# Patient Record
Sex: MALE | Race: WHITE | NOT HISPANIC OR LATINO | Employment: FULL TIME | URBAN - METROPOLITAN AREA
[De-identification: names, ages, dates, MRNs, and addresses within clinical notes are randomized per-mention and may not be internally consistent; named-entity substitution may affect disease eponyms.]

---

## 2017-06-13 ENCOUNTER — OFFICE VISIT (OUTPATIENT)
Dept: FAMILY MEDICINE | Facility: CLINIC | Age: 37
End: 2017-06-13
Payer: MEDICAID

## 2017-06-13 VITALS
SYSTOLIC BLOOD PRESSURE: 126 MMHG | TEMPERATURE: 99 F | DIASTOLIC BLOOD PRESSURE: 82 MMHG | RESPIRATION RATE: 12 BRPM | HEART RATE: 68 BPM | HEIGHT: 71 IN | BODY MASS INDEX: 23.86 KG/M2 | WEIGHT: 170.44 LBS

## 2017-06-13 DIAGNOSIS — S05.12XA CONTUSION OF GLOBE OF LEFT EYE, INITIAL ENCOUNTER: Primary | ICD-10-CM

## 2017-06-13 DIAGNOSIS — H10.32 ACUTE BACTERIAL CONJUNCTIVITIS OF LEFT EYE: ICD-10-CM

## 2017-06-13 PROCEDURE — 99214 OFFICE O/P EST MOD 30 MIN: CPT | Mod: S$GLB,,, | Performed by: NURSE PRACTITIONER

## 2017-06-13 RX ORDER — NEOMYCIN SULFATE, POLYMYXIN B SULFATE AND DEXAMETHASONE 3.5; 10000; 1 MG/ML; [USP'U]/ML; MG/ML
1 SUSPENSION/ DROPS OPHTHALMIC EVERY 6 HOURS
Qty: 5 ML | Refills: 0 | Status: SHIPPED | OUTPATIENT
Start: 2017-06-13 | End: 2020-03-04 | Stop reason: CLARIF

## 2017-06-13 NOTE — PROGRESS NOTES
"Subjective:       Patient ID: Syed Coleman is a 36 y.o. male.    Chief Complaint: 3 days left eye redness and pain, possible bug flew in it    HPI onset 3 days. Left eye redness, tender. Thinks a bug flew into it. Hurts in the sunlight. Has not applied any drops. Was boating on Saturday and a bug hit his eye. He was wearing contacts. Eye is watery. He is able to open his eye without pain. States his eye feels bruised. He has some blurred vision, but states that is because he cannot wear his contact. He has no other concerns. See ROS.    The following portion of the patients history was reviewed and updated as appropriate: allergies, current medications, past medical and surgical history. Past social history and problem list reviewed. Family PMH and Past social history reviewed. Tobacco, Illicit drug use reviewed.     Review of Systems   Constitutional: Negative for chills, fatigue and fever.   HENT: Negative for congestion, postnasal drip, rhinorrhea, sinus pressure and sore throat.    Eyes: Positive for pain (pressure and soreness), discharge and redness.        See HPI   Respiratory: Negative for cough, chest tightness, shortness of breath and wheezing.    Cardiovascular: Negative for chest pain and palpitations.   Gastrointestinal: Negative for abdominal pain, diarrhea, nausea and vomiting.   Musculoskeletal: Negative for myalgias.   Skin: Negative for wound.   Neurological: Negative for headaches.       Objective:     /82   Pulse 68   Temp 99 °F (37.2 °C) (Oral)   Resp 12   Ht 5' 11" (1.803 m)   Wt 77.3 kg (170 lb 6.7 oz)   BMI 23.77 kg/m²      Physical Exam     Constitutional: oriented to person, place, and time. well-developed and well-nourished.   HENT: normal  Head: Normocephalic.   Eyes: Conjunctivae with erythema. Pupils are equal, round, and reactive to light. he is able to open his eye without pain. Crusting to base of eyelashes. Watering to the eye.   Neck: Normal range of motion. " Neck supple. No tracheal deviation present. No thyromegaly present. no enlarged or tender anterior cervical lymph nodes.  Cardiovascular: Normal rate, regular rhythm and normal heart sounds.    Pulmonary/Chest: Effort normal and breath sounds normal. No respiratory distress. No wheezes.   Musculoskeletal: Normal range of motion. Gait and coordination normal  Assessment:       1. Contusion of globe of left eye, initial encounter    2. Acute bacterial conjunctivitis of left eye        Plan:         Syed was seen today for 3 days left eye redness and pain, possible bug flew in it.    Diagnoses and all orders for this visit:    Contusion of globe of left eye, initial encounter: can apply ice or warm compresses to the eye for comfort.     Acute bacterial conjunctivitis of left eye: will give eye drops for infection of the eye. Do not wear contacts until this has resolved. If not improving over the next 48 hours or if symptoms worsen then let me know, will need to see Optometry.     Other orders  -     neomycin-polymyxin-dexamethasone (MAXITROL) 3.5mg/mL-10,000 unit/mL-0.1 % DrpS; Place 1 drop into the left eye every 6 (six) hours.    Continue current medication  Take medications only as prescribed  Healthy diet, exercise  Adequate rest  Adequate hydration  Avoid allergens  Avoid excessive caffeine

## 2018-10-02 ENCOUNTER — TELEPHONE (OUTPATIENT)
Dept: FAMILY MEDICINE | Facility: CLINIC | Age: 38
End: 2018-10-02

## 2018-10-02 NOTE — TELEPHONE ENCOUNTER
Pt reports he is just having a hard time with his anxiety. Please advise regarding scheduling. LOV 6/13/2017. Allergies reviewed please advise.-RIVERA

## 2018-10-03 ENCOUNTER — TELEPHONE (OUTPATIENT)
Dept: FAMILY MEDICINE | Facility: CLINIC | Age: 38
End: 2018-10-03

## 2018-10-03 NOTE — TELEPHONE ENCOUNTER
----- Message from Shae Dorsey sent at 10/3/2018  9:56 AM CDT -----  Contact: Syed  Type: Needs Medical Advice    Who Called:  Patient   Best Call Back Number: [phone]  Additional Information:  Calling as thought was scheduled for 9:20 and just received text to state scheduled for 3:40; asking for earlier appointment back as working and has to close. Thanks!

## 2018-10-04 ENCOUNTER — OFFICE VISIT (OUTPATIENT)
Dept: FAMILY MEDICINE | Facility: CLINIC | Age: 38
End: 2018-10-04

## 2018-10-04 VITALS
SYSTOLIC BLOOD PRESSURE: 110 MMHG | TEMPERATURE: 98 F | HEIGHT: 71 IN | DIASTOLIC BLOOD PRESSURE: 70 MMHG | BODY MASS INDEX: 22.73 KG/M2 | HEART RATE: 68 BPM | WEIGHT: 162.38 LBS | RESPIRATION RATE: 20 BRPM

## 2018-10-04 DIAGNOSIS — F41.0 PANIC DISORDER: ICD-10-CM

## 2018-10-04 DIAGNOSIS — F41.9 ANXIETY: Primary | ICD-10-CM

## 2018-10-04 DIAGNOSIS — F51.02 TRANSIENT INSOMNIA: ICD-10-CM

## 2018-10-04 PROCEDURE — 99214 OFFICE O/P EST MOD 30 MIN: CPT | Mod: S$GLB,,, | Performed by: NURSE PRACTITIONER

## 2018-10-04 RX ORDER — ALPRAZOLAM 0.25 MG/1
0.25 TABLET ORAL 2 TIMES DAILY PRN
Qty: 40 TABLET | Refills: 0 | Status: ON HOLD | OUTPATIENT
Start: 2018-10-04 | End: 2020-03-06

## 2018-10-04 NOTE — PROGRESS NOTES
"Subjective:       Patient ID: Syed Coleman is a 37 y.o. male.    Chief Complaint: Insomnia and Anxiety    HPI situational disturbance the past 3 days. Feeling more anxious and not sleeping. Feeling depressed, sad. Denies  Any thoughts of wanting  To harm himself or others.  States he has been treated with anxiety in the past.  States this is not a daily issue.  He was given a prescription for Xanax 0.25 in 2015 and that prescription has lasted him until now.  He has the prescription bottle with him today.  The a web site does not show any concerning behavior.  States he does not take the Xanax often just when he feels panicked and overly anxious.  He does not feel that daily medication is needed.  States he has been on antidepressants in the past and does made him feel worse.  He denies any other concerns today.  See review of systems.    The following portion of the patients history was reviewed and updated as appropriate: allergies, current medications, past medical and surgical history. Past social history and problem list reviewed. Family PMH and Past social history reviewed. Tobacco, Illicit drug use reviewed.     Review of Systems   Constitutional: Negative for fatigue and fever.   HENT: Negative.    Eyes: Negative for visual disturbance.   Respiratory: Negative for cough, shortness of breath and wheezing.    Cardiovascular: Negative for chest pain, palpitations and leg swelling.   Gastrointestinal: Negative for abdominal pain, diarrhea, nausea and vomiting.   Musculoskeletal: Negative for arthralgias and gait problem.   Neurological: Negative for headaches.   Psychiatric/Behavioral: Positive for sleep disturbance. Negative for agitation, behavioral problems, dysphoric mood, self-injury and suicidal ideas. The patient is nervous/anxious.        Objective:     /70   Pulse 68   Temp 98.2 °F (36.8 °C) (Oral)   Resp 20   Ht 5' 11" (1.803 m)   Wt 73.7 kg (162 lb 6.4 oz)   BMI 22.65 kg/m²  "     Physical Exam     Constitutional: oriented to person, place, and time. well-developed and well-nourished.   Neck: Normal range of motion. Neck supple. No tracheal deviation present. No thyromegaly present.   Cardiovascular: Normal rate, regular rhythm and normal heart sounds.    Pulmonary/Chest: Effort normal and breath sounds normal. No respiratory distress. No wheezes.   Abdominal: Soft. Bowel sounds are normal. No distension. There is no tenderness.   Musculoskeletal: Normal range of motion.  Gait and coordination normal.   Neurological: oriented to person, place, and time.   Skin: Skin is warm and dry. No rashes or lesions  Psychiatric: Normal mood and affect.Behavior is normal. Judgment and thought content normal.  he does not present as a threat to himself or others.  He does not desire referral to Psychiatry at this time.  Assessment:       1. Anxiety    2. Transient insomnia    3. Panic disorder        Plan:         Syed was seen today for insomnia and anxiety.    Diagnoses and all orders for this visit:    Anxiety: will give him refill of the xanax to take only on PRN basis.  If he feels this  Is something he is going to need daily then will need  To revaluate medication.  -     ALPRAZolam (XANAX) 0.25 MG tablet; Take 1 tablet (0.25 mg total) by mouth 2 (two) times daily as needed for Anxiety. Can take up to 2 at night for insomnia   Do not take with ETOH, Sedatives or other narcotics. Do not take and drive due to potential for sedation. Do not take more than the prescribed amount.    Transient insomnia  -     ALPRAZolam (XANAX) 0.25 MG tablet; Take 1 tablet (0.25 mg total) by mouth 2 (two) times daily as needed for Anxiety. Can take up to 2 at night for insomnia    Panic disorder    Continue current medication  Take medications only as prescribed  Healthy diet, exercise  Adequate rest  Adequate hydration  Avoid allergens  Avoid excessive caffeine

## 2020-03-04 PROBLEM — R07.9 CHEST PAIN: Status: ACTIVE | Noted: 2020-03-04

## 2020-03-04 PROBLEM — R79.89 TROPONIN LEVEL ELEVATED: Status: ACTIVE | Noted: 2020-03-04

## 2020-03-04 PROBLEM — F41.9 ANXIETY: Status: ACTIVE | Noted: 2020-03-04

## 2020-03-04 PROBLEM — R03.0 ELEVATED BLOOD PRESSURE READING: Status: ACTIVE | Noted: 2020-03-04

## 2020-06-04 ENCOUNTER — OFFICE VISIT (OUTPATIENT)
Dept: FAMILY MEDICINE | Facility: CLINIC | Age: 40
End: 2020-06-04
Payer: COMMERCIAL

## 2020-06-04 VITALS
BODY MASS INDEX: 22.96 KG/M2 | DIASTOLIC BLOOD PRESSURE: 76 MMHG | HEART RATE: 74 BPM | WEIGHT: 164 LBS | OXYGEN SATURATION: 98 % | TEMPERATURE: 98 F | HEIGHT: 71 IN | SYSTOLIC BLOOD PRESSURE: 120 MMHG

## 2020-06-04 DIAGNOSIS — M54.41 ACUTE RIGHT-SIDED LOW BACK PAIN WITH RIGHT-SIDED SCIATICA: ICD-10-CM

## 2020-06-04 DIAGNOSIS — I10 ESSENTIAL HYPERTENSION: Primary | ICD-10-CM

## 2020-06-04 DIAGNOSIS — F41.9 ANXIETY: ICD-10-CM

## 2020-06-04 DIAGNOSIS — F17.210 CIGARETTE NICOTINE DEPENDENCE WITHOUT COMPLICATION: ICD-10-CM

## 2020-06-04 PROBLEM — R07.9 CHEST PAIN: Status: RESOLVED | Noted: 2020-03-04 | Resolved: 2020-06-04

## 2020-06-04 PROCEDURE — 99214 PR OFFICE/OUTPT VISIT, EST, LEVL IV, 30-39 MIN: ICD-10-PCS | Mod: S$GLB,,, | Performed by: NURSE PRACTITIONER

## 2020-06-04 PROCEDURE — 99214 OFFICE O/P EST MOD 30 MIN: CPT | Mod: S$GLB,,, | Performed by: NURSE PRACTITIONER

## 2020-06-04 RX ORDER — ATENOLOL AND CHLORTHALIDONE TABLET 50; 25 MG/1; MG/1
1 TABLET ORAL DAILY
Qty: 30 TABLET | Refills: 3 | Status: SHIPPED | OUTPATIENT
Start: 2020-06-04 | End: 2020-10-15 | Stop reason: SDUPTHER

## 2020-06-04 RX ORDER — KETOROLAC TROMETHAMINE 10 MG/1
10 TABLET, FILM COATED ORAL 3 TIMES DAILY
Qty: 15 TABLET | Refills: 0 | Status: SHIPPED | OUTPATIENT
Start: 2020-06-04 | End: 2021-04-27 | Stop reason: ALTCHOICE

## 2020-06-04 RX ORDER — TIZANIDINE 4 MG/1
4 TABLET ORAL EVERY 6 HOURS PRN
Qty: 20 TABLET | Refills: 0 | Status: SHIPPED | OUTPATIENT
Start: 2020-06-04 | End: 2020-06-14

## 2020-06-04 RX ORDER — ALPRAZOLAM 0.25 MG/1
0.5 TABLET ORAL 2 TIMES DAILY PRN
Qty: 40 TABLET | Refills: 0 | Status: SHIPPED | OUTPATIENT
Start: 2020-06-04 | End: 2021-04-27 | Stop reason: SDUPTHER

## 2020-06-04 NOTE — PROGRESS NOTES
Subjective:       Patient ID: Syed Coleman is a 39 y.o. male.    Chief Complaint: Back Pain and Medication Refill    HPI onset one week with lower back pain. 9/10 on pain scale. Sciatica right side. Wears a back brace at work. Has two bulging disks. Pain worse with movement. No weakness to lower extremities. States has a flare up at times, has been doing more heavy lifting lately since being short handed at work. He has been to chiropractor in the past but did not find it helped much.    He has HTN. BP in good range. He is doing well on current medication. He has anxiety at times. He takes small dose of xanax only on PRN basis.  States has been high stress time since the Covid outbreak. He shows no signs of abusing medication per the [employer] website.     He had cardiac cath in March. No blockages found.     He does not want to have labs done today. He refuses to have Tdap. States he will have these done at his next visit.     The following portion of the patients history was reviewed and updated as appropriate: allergies, current medications, past medical and surgical history. Past social history and problem list reviewed. Family PMH and Past social history reviewed. Tobacco, Illicit drug use reviewed.      Review of patient's allergies indicates:   Allergen Reactions    Ibuprofen      Other reaction(s): gasric upset         Current Outpatient Medications:     ALPRAZolam (XANAX) 0.25 MG tablet, Take 2 tablets (0.5 mg total) by mouth 2 (two) times daily as needed for Anxiety. Can take up to 2 at night for insomnia, Disp: 40 tablet, Rfl: 0    atenoloL-chlorthalidone (TENORETIC) 50-25 mg Tab, Take 1 tablet by mouth once daily., Disp: 30 tablet, Rfl: 3    Past Medical History:   Diagnosis Date    Hypertension     Tobacco use disorder        Past Surgical History:   Procedure Laterality Date    CORONARY ANGIOGRAPHY N/A 3/5/2020    Procedure: ANGIOGRAM, CORONARY ARTERY;  Surgeon: Ryan Sanchez MD;  Location:  [facility] CATH;  Service: Cardiovascular;  Laterality: N/A;    FINGER SURGERY Right     5th digit    LEFT HEART CATHETERIZATION Left 3/5/2020    Procedure: Left heart cath;  Surgeon: Ryan Sanchez MD;  Location: [facility] CATH;  Service: Cardiovascular;  Laterality: Left;    OTHER SURGICAL HISTORY      injection procedure for pain       Social History     Socioeconomic History    Marital status:      Spouse name: Not on file    Number of children: Not on file    Years of education: Not on file    Highest education level: Not on file   Occupational History    Not on file   Social Needs    Financial resource strain: Not on file    Food insecurity:     Worry: Not on file     Inability: Not on file    Transportation needs:     Medical: Not on file     Non-medical: Not on file   Tobacco Use    Smoking status: Current Every Day Smoker     Packs/day: 1.00     Types: Cigarettes    Smokeless tobacco: Never Used   Substance and Sexual Activity    Alcohol use: Yes     Alcohol/week: 0.0 standard drinks     Comment: Occasionally: Beer    Drug use: No    Sexual activity: Not on file   Lifestyle    Physical activity:     Days per week: Not on file     Minutes per session: Not on file    Stress: Not on file   Relationships    Social connections:     Talks on phone: Not on file     Gets together: Not on file     Attends Zoroastrianism service: Not on file     Active member of club or organization: Not on file     Attends meetings of clubs or organizations: Not on file     Relationship status: Not on file   Other Topics Concern    Not on file   Social History Narrative    Not on file     Review of Systems   Constitutional: Negative for chills, fatigue and fever.   HENT: Negative for congestion, sneezing, trouble swallowing and voice change.    Eyes: Negative for redness and visual disturbance.   Respiratory: Negative for cough, choking, chest tightness, shortness of breath and wheezing.    Cardiovascular: Negative for  "chest pain, palpitations and leg swelling.   Gastrointestinal: Negative for abdominal distention, abdominal pain, blood in stool, constipation, diarrhea, nausea and vomiting.   Genitourinary: Negative for difficulty urinating, flank pain and frequency.   Musculoskeletal: Positive for back pain (lower back on the right side. radiates down back of right leg. worse with movement. ). Negative for arthralgias and gait problem.   Skin: Negative for rash and wound.   Neurological: Negative for dizziness, weakness and headaches.   Hematological: Negative for adenopathy. Does not bruise/bleed easily.   Psychiatric/Behavioral: Negative for dysphoric mood and sleep disturbance. The patient is nervous/anxious.        Objective:      /76   Pulse 74   Temp 98 °F (36.7 °C) (Oral)   Ht 5' 11" (1.803 m)   Wt 74.4 kg (164 lb 0.4 oz)   SpO2 98%   BMI 22.88 kg/m²      Physical Exam   Constitutional: He is oriented to person, place, and time. He appears well-developed and well-nourished. No distress.   HENT:   Head: Normocephalic and atraumatic.   Mouth/Throat: No oropharyngeal exudate.   Eyes: Pupils are equal, round, and reactive to light. Conjunctivae are normal. Right eye exhibits no discharge. Left eye exhibits no discharge.   Neck: Normal range of motion. Neck supple. No JVD present. No thyromegaly present.   Cardiovascular: Normal rate, regular rhythm and normal heart sounds. Exam reveals no gallop.   No murmur heard.  Pulmonary/Chest: Effort normal and breath sounds normal. No respiratory distress. He has no wheezes. He has no rales. He exhibits no tenderness.   Abdominal: Soft. Bowel sounds are normal. He exhibits no distension. There is no tenderness. There is no rebound and no guarding.   Musculoskeletal: Normal range of motion. He exhibits no edema.   Tenderness along the right lumbar spine area with palpation. Muscle spasms. Positive leg raises on the right side.normal upper and lower extremity strength "   Lymphadenopathy:     He has no cervical adenopathy.   Neurological: He is alert and oriented to person, place, and time.   Skin: Skin is warm and dry. Capillary refill takes less than 2 seconds. No rash noted. He is not diaphoretic.   Psychiatric: He has a normal mood and affect. His behavior is normal. Judgment and thought content normal.   Nursing note and vitals reviewed.      Assessment:       1. Essential hypertension    2. Anxiety    3. Acute right-sided low back pain with right-sided sciatica    4. Cigarette nicotine dependence without complication        Plan:       Essential hypertension: good BP control. Continue current medication.    Anxiety: no indication of abuse by the [employer] website.  -     ALPRAZolam (XANAX) 0.25 MG tablet; Take 2 tablets (0.5 mg total) by mouth 2 (two) times daily as needed for Anxiety. Can take up to 2 at night for insomnia  Dispense: 40 tablet; Refill: 0    He understands Xanax is a benzodiazepine with the potential for addiction and tolerance.  The patient also understands that all benzos impair reflexes and cognition and so the patient should not drive, operate heavy machinery or make significant decisions while taking the benzodiazepine.  The patient should also not take the benzodiazepines with alcohol.    Acute right-sided low back pain with right-sided sciatica: toradol 10mg TID for 5 days, zanaflex for muscle spasms. Do not take zanaflex and drive if sedation occurs. Do not mix with ETOH or other sedative type medications.    Cigarette nicotine dependence without complication: smoking cessation encouraged.     Other orders  -     ketorolac (TORADOL) 10 mg tablet; Take 1 tablet (10 mg total) by mouth 3 (three) times daily.  Dispense: 15 tablet; Refill: 0  -     tiZANidine (ZANAFLEX) 4 MG tablet; Take 1 tablet (4 mg total) by mouth every 6 (six) hours as needed.  Dispense: 20 tablet; Refill: 0  -     atenoloL-chlorthalidone (TENORETIC) 50-25 mg Tab; Take 1 tablet by mouth  once daily.  Dispense: 30 tablet; Refill: 3       Continue current medication  Take medications only as prescribed  Healthy diet, exercise  Adequate rest  Adequate hydration  Avoid allergens  Avoid excessive caffeine     follow up 6 months

## 2020-10-15 DIAGNOSIS — I10 ESSENTIAL HYPERTENSION: Primary | ICD-10-CM

## 2020-10-15 RX ORDER — ATENOLOL AND CHLORTHALIDONE TABLET 50; 25 MG/1; MG/1
1 TABLET ORAL DAILY
Qty: 30 TABLET | Refills: 3 | Status: SHIPPED | OUTPATIENT
Start: 2020-10-15 | End: 2021-02-02

## 2021-04-04 DIAGNOSIS — I10 ESSENTIAL HYPERTENSION: ICD-10-CM

## 2021-04-05 RX ORDER — ATENOLOL AND CHLORTHALIDONE TABLET 50; 25 MG/1; MG/1
TABLET ORAL
Qty: 30 TABLET | Refills: 0 | Status: SHIPPED | OUTPATIENT
Start: 2021-04-05 | End: 2021-04-27 | Stop reason: SDUPTHER

## 2021-04-27 ENCOUNTER — OFFICE VISIT (OUTPATIENT)
Dept: FAMILY MEDICINE | Facility: CLINIC | Age: 41
End: 2021-04-27
Payer: COMMERCIAL

## 2021-04-27 VITALS
HEART RATE: 67 BPM | DIASTOLIC BLOOD PRESSURE: 72 MMHG | WEIGHT: 160.5 LBS | RESPIRATION RATE: 16 BRPM | BODY MASS INDEX: 22.47 KG/M2 | SYSTOLIC BLOOD PRESSURE: 116 MMHG | TEMPERATURE: 99 F | OXYGEN SATURATION: 98 % | HEIGHT: 71 IN

## 2021-04-27 DIAGNOSIS — F32.A DEPRESSION, UNSPECIFIED DEPRESSION TYPE: ICD-10-CM

## 2021-04-27 DIAGNOSIS — F41.9 ANXIETY: ICD-10-CM

## 2021-04-27 DIAGNOSIS — F17.200 TOBACCO USE DISORDER: ICD-10-CM

## 2021-04-27 DIAGNOSIS — I10 ESSENTIAL HYPERTENSION: ICD-10-CM

## 2021-04-27 DIAGNOSIS — F51.01 PRIMARY INSOMNIA: ICD-10-CM

## 2021-04-27 DIAGNOSIS — Z00.00 ANNUAL PHYSICAL EXAM: Primary | ICD-10-CM

## 2021-04-27 DIAGNOSIS — E78.5 HYPERLIPIDEMIA LDL GOAL <160: ICD-10-CM

## 2021-04-27 PROCEDURE — 99396 PREV VISIT EST AGE 40-64: CPT | Mod: S$GLB,,, | Performed by: NURSE PRACTITIONER

## 2021-04-27 PROCEDURE — 99396 PR PREVENTIVE VISIT,EST,40-64: ICD-10-PCS | Mod: S$GLB,,, | Performed by: NURSE PRACTITIONER

## 2021-04-27 RX ORDER — TRAZODONE HYDROCHLORIDE 50 MG/1
TABLET ORAL
Qty: 60 TABLET | Refills: 4 | Status: SHIPPED | OUTPATIENT
Start: 2021-04-27 | End: 2021-11-01

## 2021-04-27 RX ORDER — ALPRAZOLAM 0.25 MG/1
0.5 TABLET ORAL 2 TIMES DAILY PRN
Qty: 40 TABLET | Refills: 0 | Status: SHIPPED | OUTPATIENT
Start: 2021-04-27 | End: 2022-06-01

## 2021-04-27 RX ORDER — ATENOLOL AND CHLORTHALIDONE TABLET 50; 25 MG/1; MG/1
1 TABLET ORAL DAILY
Qty: 90 TABLET | Refills: 2 | Status: SHIPPED | OUTPATIENT
Start: 2021-04-27 | End: 2022-01-25

## 2021-05-01 ENCOUNTER — LAB VISIT (OUTPATIENT)
Dept: LAB | Facility: HOSPITAL | Age: 41
End: 2021-05-01
Payer: COMMERCIAL

## 2021-05-01 DIAGNOSIS — E78.5 HYPERLIPIDEMIA LDL GOAL <160: ICD-10-CM

## 2021-05-01 DIAGNOSIS — I10 ESSENTIAL HYPERTENSION: ICD-10-CM

## 2021-05-01 LAB
ALBUMIN SERPL BCP-MCNC: 3.9 G/DL (ref 3.5–5.2)
ALP SERPL-CCNC: 71 U/L (ref 55–135)
ALT SERPL W/O P-5'-P-CCNC: 22 U/L (ref 10–44)
ANION GAP SERPL CALC-SCNC: 11 MMOL/L (ref 8–16)
AST SERPL-CCNC: 32 U/L (ref 10–40)
BASOPHILS # BLD AUTO: 0.07 K/UL (ref 0–0.2)
BASOPHILS NFR BLD: 0.7 % (ref 0–1.9)
BILIRUB SERPL-MCNC: 0.7 MG/DL (ref 0.1–1)
BUN SERPL-MCNC: 6 MG/DL (ref 6–20)
CALCIUM SERPL-MCNC: 9.9 MG/DL (ref 8.7–10.5)
CHLORIDE SERPL-SCNC: 101 MMOL/L (ref 95–110)
CHOLEST SERPL-MCNC: 189 MG/DL (ref 120–199)
CHOLEST/HDLC SERPL: 2.4 {RATIO} (ref 2–5)
CO2 SERPL-SCNC: 28 MMOL/L (ref 23–29)
CREAT SERPL-MCNC: 0.7 MG/DL (ref 0.5–1.4)
DIFFERENTIAL METHOD: ABNORMAL
EOSINOPHIL # BLD AUTO: 0.2 K/UL (ref 0–0.5)
EOSINOPHIL NFR BLD: 1.5 % (ref 0–8)
ERYTHROCYTE [DISTWIDTH] IN BLOOD BY AUTOMATED COUNT: 12.7 % (ref 11.5–14.5)
EST. GFR  (AFRICAN AMERICAN): >60 ML/MIN/1.73 M^2
EST. GFR  (NON AFRICAN AMERICAN): >60 ML/MIN/1.73 M^2
GLUCOSE SERPL-MCNC: 104 MG/DL (ref 70–110)
HCT VFR BLD AUTO: 49 % (ref 40–54)
HDLC SERPL-MCNC: 78 MG/DL (ref 40–75)
HDLC SERPL: 41.3 % (ref 20–50)
HGB BLD-MCNC: 16.3 G/DL (ref 14–18)
IMM GRANULOCYTES # BLD AUTO: 0.04 K/UL (ref 0–0.04)
IMM GRANULOCYTES NFR BLD AUTO: 0.4 % (ref 0–0.5)
LDLC SERPL CALC-MCNC: 95.4 MG/DL (ref 63–159)
LYMPHOCYTES # BLD AUTO: 1.2 K/UL (ref 1–4.8)
LYMPHOCYTES NFR BLD: 11.5 % (ref 18–48)
MCH RBC QN AUTO: 32.5 PG (ref 27–31)
MCHC RBC AUTO-ENTMCNC: 33.3 G/DL (ref 32–36)
MCV RBC AUTO: 98 FL (ref 82–98)
MONOCYTES # BLD AUTO: 0.7 K/UL (ref 0.3–1)
MONOCYTES NFR BLD: 6.7 % (ref 4–15)
NEUTROPHILS # BLD AUTO: 8.4 K/UL (ref 1.8–7.7)
NEUTROPHILS NFR BLD: 79.2 % (ref 38–73)
NONHDLC SERPL-MCNC: 111 MG/DL
NRBC BLD-RTO: 0 /100 WBC
PLATELET # BLD AUTO: 283 K/UL (ref 150–450)
PMV BLD AUTO: 10.2 FL (ref 9.2–12.9)
POTASSIUM SERPL-SCNC: 3.9 MMOL/L (ref 3.5–5.1)
PROT SERPL-MCNC: 7.4 G/DL (ref 6–8.4)
RBC # BLD AUTO: 5.01 M/UL (ref 4.6–6.2)
SODIUM SERPL-SCNC: 140 MMOL/L (ref 136–145)
TRIGL SERPL-MCNC: 78 MG/DL (ref 30–150)
WBC # BLD AUTO: 10.64 K/UL (ref 3.9–12.7)

## 2021-05-01 PROCEDURE — 36415 COLL VENOUS BLD VENIPUNCTURE: CPT | Mod: PO | Performed by: NURSE PRACTITIONER

## 2021-05-01 PROCEDURE — 80053 COMPREHEN METABOLIC PANEL: CPT | Performed by: NURSE PRACTITIONER

## 2021-05-01 PROCEDURE — 80061 LIPID PANEL: CPT | Performed by: NURSE PRACTITIONER

## 2021-05-01 PROCEDURE — 85025 COMPLETE CBC W/AUTO DIFF WBC: CPT | Performed by: NURSE PRACTITIONER

## 2022-05-26 ENCOUNTER — OFFICE VISIT (OUTPATIENT)
Dept: FAMILY MEDICINE | Facility: CLINIC | Age: 42
End: 2022-05-26
Payer: COMMERCIAL

## 2022-05-26 VITALS
SYSTOLIC BLOOD PRESSURE: 126 MMHG | WEIGHT: 154.13 LBS | DIASTOLIC BLOOD PRESSURE: 84 MMHG | OXYGEN SATURATION: 99 % | HEART RATE: 64 BPM | BODY MASS INDEX: 21.49 KG/M2 | TEMPERATURE: 98 F | RESPIRATION RATE: 12 BRPM

## 2022-05-26 DIAGNOSIS — E78.5 HYPERLIPIDEMIA LDL GOAL <160: ICD-10-CM

## 2022-05-26 DIAGNOSIS — F41.9 ANXIETY: ICD-10-CM

## 2022-05-26 DIAGNOSIS — Z00.00 LABORATORY EXAM ORDERED AS PART OF ROUTINE GENERAL MEDICAL EXAMINATION: ICD-10-CM

## 2022-05-26 DIAGNOSIS — S81.811A LACERATION OF RIGHT LOWER EXTREMITY, INITIAL ENCOUNTER: ICD-10-CM

## 2022-05-26 DIAGNOSIS — F17.210 CIGARETTE NICOTINE DEPENDENCE WITHOUT COMPLICATION: ICD-10-CM

## 2022-05-26 DIAGNOSIS — I10 ESSENTIAL HYPERTENSION: ICD-10-CM

## 2022-05-26 DIAGNOSIS — Z48.02 ENCOUNTER FOR REMOVAL OF SUTURES: ICD-10-CM

## 2022-05-26 DIAGNOSIS — Z11.59 NEED FOR HEPATITIS C SCREENING TEST: ICD-10-CM

## 2022-05-26 DIAGNOSIS — F51.01 PRIMARY INSOMNIA: ICD-10-CM

## 2022-05-26 DIAGNOSIS — Z00.00 ANNUAL PHYSICAL EXAM: Primary | ICD-10-CM

## 2022-05-26 PROCEDURE — 99396 PREV VISIT EST AGE 40-64: CPT | Mod: 25,S$GLB,, | Performed by: NURSE PRACTITIONER

## 2022-05-26 PROCEDURE — 3008F PR BODY MASS INDEX (BMI) DOCUMENTED: ICD-10-PCS | Mod: CPTII,S$GLB,, | Performed by: NURSE PRACTITIONER

## 2022-05-26 PROCEDURE — 1159F PR MEDICATION LIST DOCUMENTED IN MEDICAL RECORD: ICD-10-PCS | Mod: CPTII,S$GLB,, | Performed by: NURSE PRACTITIONER

## 2022-05-26 PROCEDURE — 1160F RVW MEDS BY RX/DR IN RCRD: CPT | Mod: CPTII,S$GLB,, | Performed by: NURSE PRACTITIONER

## 2022-05-26 PROCEDURE — 3074F SYST BP LT 130 MM HG: CPT | Mod: CPTII,S$GLB,, | Performed by: NURSE PRACTITIONER

## 2022-05-26 PROCEDURE — 3074F PR MOST RECENT SYSTOLIC BLOOD PRESSURE < 130 MM HG: ICD-10-PCS | Mod: CPTII,S$GLB,, | Performed by: NURSE PRACTITIONER

## 2022-05-26 PROCEDURE — 3079F PR MOST RECENT DIASTOLIC BLOOD PRESSURE 80-89 MM HG: ICD-10-PCS | Mod: CPTII,S$GLB,, | Performed by: NURSE PRACTITIONER

## 2022-05-26 PROCEDURE — 3079F DIAST BP 80-89 MM HG: CPT | Mod: CPTII,S$GLB,, | Performed by: NURSE PRACTITIONER

## 2022-05-26 PROCEDURE — 3008F BODY MASS INDEX DOCD: CPT | Mod: CPTII,S$GLB,, | Performed by: NURSE PRACTITIONER

## 2022-05-26 PROCEDURE — 1160F PR REVIEW ALL MEDS BY PRESCRIBER/CLIN PHARMACIST DOCUMENTED: ICD-10-PCS | Mod: CPTII,S$GLB,, | Performed by: NURSE PRACTITIONER

## 2022-05-26 PROCEDURE — 1159F MED LIST DOCD IN RCRD: CPT | Mod: CPTII,S$GLB,, | Performed by: NURSE PRACTITIONER

## 2022-05-26 PROCEDURE — 99024 SUTURE REMOVAL: ICD-10-PCS | Mod: S$GLB,,, | Performed by: NURSE PRACTITIONER

## 2022-05-26 PROCEDURE — 99024 POSTOP FOLLOW-UP VISIT: CPT | Mod: S$GLB,,, | Performed by: NURSE PRACTITIONER

## 2022-05-26 PROCEDURE — 99396 PR PREVENTIVE VISIT,EST,40-64: ICD-10-PCS | Mod: 25,S$GLB,, | Performed by: NURSE PRACTITIONER

## 2022-05-26 RX ORDER — TRAZODONE HYDROCHLORIDE 50 MG/1
TABLET ORAL
Qty: 60 TABLET | Refills: 4 | Status: SHIPPED | OUTPATIENT
Start: 2022-05-26 | End: 2023-03-13

## 2022-05-26 RX ORDER — CLOBETASOL PROPIONATE 0.5 MG/G
CREAM TOPICAL 2 TIMES DAILY
Qty: 60 G | Refills: 1 | Status: SHIPPED | OUTPATIENT
Start: 2022-05-26 | End: 2022-12-15 | Stop reason: ALTCHOICE

## 2022-05-26 RX ORDER — ATENOLOL AND CHLORTHALIDONE TABLET 50; 25 MG/1; MG/1
1 TABLET ORAL DAILY
Qty: 90 TABLET | Refills: 2 | Status: SHIPPED | OUTPATIENT
Start: 2022-05-26 | End: 2023-06-09

## 2022-05-26 NOTE — PROGRESS NOTES
Subjective:       Patient ID: Syed Coleman is a 41 y.o. male.    Chief Complaint: annual exam    HPI Here for annual exam. States he tripped down stairs and landed on fish tank, sustained a small laceration to posterior right thigh. He went to [facility] and had Sutures placed. This was a week ago, needs to have sutures removed.     He is due for labs. He has good BP control. He would like refill on Trazodone that he takes for insomnia as needed. States it works well.     He denies any specific concerns. See ROS.     The following portion of the patients history was reviewed and updated as appropriate: allergies, current medications, past medical and surgical history. Past social history and problem list reviewed. Family PMH and Past social history reviewed. Tobacco, Illicit drug use reviewed.      Review of patient's allergies indicates:   Allergen Reactions    Ibuprofen      Other reaction(s): gasric upset         Current Outpatient Medications:     ALPRAZolam (XANAX) 0.25 MG tablet, Take 2 tablets (0.5 mg total) by mouth 2 (two) times daily as needed for Anxiety. Can take up to 2 at night for insomnia, Disp: 40 tablet, Rfl: 0    atenoloL-chlorthalidone (TENORETIC) 50-25 mg Tab, TAKE 1 TABLET BY MOUTH EVERY DAY, Disp: 90 tablet, Rfl: 2    traZODone (DESYREL) 50 MG tablet, TAKE 1 TO 2 TABLETS BY MOUTH AT BEDTIME AS NEEDED FOR INSOMNIA, Disp: 60 tablet, Rfl: 2    Past Medical History:   Diagnosis Date    Anxiety     Hypertension     Tobacco use disorder        Past Surgical History:   Procedure Laterality Date    CORONARY ANGIOGRAPHY N/A 3/5/2020    Procedure: ANGIOGRAM, CORONARY ARTERY;  Surgeon: Ryan Sanchez MD;  Location: [facility] CATH;  Service: Cardiovascular;  Laterality: N/A;    FINGER SURGERY Right     5th digit    LEFT HEART CATHETERIZATION Left 3/5/2020    Procedure: Left heart cath;  Surgeon: Ryan Sanchez MD;  Location: [facility] CATH;  Service: Cardiovascular;  Laterality: Left;     OTHER SURGICAL HISTORY      injection procedure for pain       Social History     Socioeconomic History    Marital status:    Tobacco Use    Smoking status: Current Every Day Smoker     Packs/day: 1.00     Types: Cigarettes    Smokeless tobacco: Never Used   Substance and Sexual Activity    Alcohol use: Yes     Alcohol/week: 0.0 standard drinks     Comment: Occasionally: Beer    Drug use: No         Review of Systems   Constitutional: Negative for activity change, appetite change, fatigue and fever.   HENT: Negative for congestion, rhinorrhea and trouble swallowing.    Eyes: Negative for visual disturbance.   Respiratory: Negative for cough, chest tightness, shortness of breath and wheezing.    Cardiovascular: Negative for chest pain, palpitations and leg swelling.   Gastrointestinal: Negative for abdominal pain, blood in stool, diarrhea, nausea and vomiting.   Genitourinary: Negative for difficulty urinating.   Musculoskeletal: Positive for arthralgias. Negative for back pain and gait problem.   Skin: Negative for rash.        Laceration with sutures.    Neurological: Negative for dizziness, weakness and headaches.   Hematological: Negative for adenopathy. Does not bruise/bleed easily.   Psychiatric/Behavioral: Negative for decreased concentration, dysphoric mood and sleep disturbance. The patient is not nervous/anxious.        Objective:      Pulse 64   Temp 98.2 °F (36.8 °C) (Temporal)   Resp 12   Wt 69.9 kg (154 lb 1.6 oz)   SpO2 99%   BMI 21.49 kg/m²      Physical Exam  Vitals and nursing note reviewed.   Constitutional:       General: He is not in acute distress.     Appearance: He is well-developed and normal weight. He is not diaphoretic.   HENT:      Head: Normocephalic and atraumatic.      Mouth/Throat:      Mouth: Mucous membranes are moist.      Pharynx: Oropharynx is clear. No oropharyngeal exudate.   Eyes:      General:         Right eye: No discharge.         Left eye: No discharge.       Conjunctiva/sclera: Conjunctivae normal.      Pupils: Pupils are equal, round, and reactive to light.   Neck:      Thyroid: No thyromegaly.      Vascular: No carotid bruit or JVD.   Cardiovascular:      Rate and Rhythm: Normal rate and regular rhythm.      Pulses: Normal pulses.           Carotid pulses are 2+ on the right side and 2+ on the left side.       Radial pulses are 2+ on the right side and 2+ on the left side.      Heart sounds: Normal heart sounds. No murmur heard.    No gallop.   Pulmonary:      Effort: Pulmonary effort is normal. No respiratory distress.      Breath sounds: Normal breath sounds. No wheezing or rales.   Chest:      Chest wall: No tenderness.   Abdominal:      General: Bowel sounds are normal. There is no distension.      Palpations: Abdomen is soft.      Tenderness: There is no abdominal tenderness. There is no guarding or rebound.   Musculoskeletal:         General: Normal range of motion.      Cervical back: Full passive range of motion without pain, normal range of motion and neck supple.      Right lower leg: No edema.      Left lower leg: No edema.      Comments: Gait and coordination normal.  strong, equal. Upper and lower extremity strength normal.    Lymphadenopathy:      Cervical: No cervical adenopathy.   Skin:     General: Skin is warm and dry.      Capillary Refill: Capillary refill takes less than 2 seconds.      Findings: Wound (posterior right thigh. see procedure note) present. No rash.   Neurological:      General: No focal deficit present.      Mental Status: He is alert and oriented to person, place, and time.   Psychiatric:         Attention and Perception: Attention and perception normal.         Mood and Affect: Mood and affect normal.         Speech: Speech normal.         Behavior: Behavior normal.         Assessment:       1. Annual physical exam    2. Encounter for removal of sutures    3. Laceration of right lower extremity, initial encounter    4.  Essential hypertension    5. Primary insomnia    6. Hyperlipidemia LDL goal <160    7. Anxiety    8. Cigarette nicotine dependence without complication    9. Laboratory exam ordered as part of routine general medical examination    10. Need for hepatitis C screening test        Plan:       Annual physical exam    Encounter for removal of sutures: see procedure note    Laceration of right lower extremity, initial encounter: staples applied at [facility].     Essential hypertension: good BP control.   -     atenoloL-chlorthalidone (TENORETIC) 50-25 mg Tab; Take 1 tablet by mouth once daily.  Dispense: 90 tablet; Refill: 2    Primary insomnia: trazodone prn. Working well.     Hyperlipidemia LDL goal <160: due for labs.     Anxiety: xanax on prn basis. He does not take this often. Last filled in April.     Cigarette nicotine dependence without complication: Patient does not want referral to smoking cessation clinic at this time. Smoking cessation encouraged.    Laboratory exam ordered as part of routine general medical examination  -     CBC Auto Differential; Future; Expected date: 05/26/2022  -     Comprehensive Metabolic Panel; Future; Expected date: 05/26/2022  -     Lipid Panel; Future; Expected date: 05/26/2022    Need for hepatitis C screening test  -     Hepatitis C Antibody; Future; Expected date: 05/26/2022    Other orders  -     traZODone (DESYREL) 50 MG tablet; TAKE 1 TO 2 TABLETS BY MOUTH AT BEDTIME AS NEEDED FOR INSOMNIA  Dispense: 60 tablet; Refill: 4  -     clobetasoL (TEMOVATE) 0.05 % cream; Apply topically 2 (two) times daily.  Dispense: 60 g; Refill: 1       Continue current medication  Take medications only as prescribed  Healthy diet, exercise  Adequate rest  Adequate hydration  Avoid allergens  Avoid excessive caffeine       Follow up 6 months

## 2022-05-28 ENCOUNTER — LAB VISIT (OUTPATIENT)
Dept: LAB | Facility: HOSPITAL | Age: 42
End: 2022-05-28
Payer: COMMERCIAL

## 2022-05-28 DIAGNOSIS — Z00.00 LABORATORY EXAM ORDERED AS PART OF ROUTINE GENERAL MEDICAL EXAMINATION: ICD-10-CM

## 2022-05-28 DIAGNOSIS — Z11.59 NEED FOR HEPATITIS C SCREENING TEST: ICD-10-CM

## 2022-05-28 LAB
ALBUMIN SERPL BCP-MCNC: 4.1 G/DL (ref 3.5–5.2)
ALP SERPL-CCNC: 88 U/L (ref 55–135)
ALT SERPL W/O P-5'-P-CCNC: 19 U/L (ref 10–44)
ANION GAP SERPL CALC-SCNC: 12 MMOL/L (ref 8–16)
AST SERPL-CCNC: 30 U/L (ref 10–40)
BASOPHILS # BLD AUTO: 0.08 K/UL (ref 0–0.2)
BASOPHILS NFR BLD: 1.1 % (ref 0–1.9)
BILIRUB SERPL-MCNC: 0.8 MG/DL (ref 0.1–1)
BUN SERPL-MCNC: 6 MG/DL (ref 6–20)
CALCIUM SERPL-MCNC: 9.8 MG/DL (ref 8.7–10.5)
CHLORIDE SERPL-SCNC: 101 MMOL/L (ref 95–110)
CHOLEST SERPL-MCNC: 231 MG/DL (ref 120–199)
CHOLEST/HDLC SERPL: 2.7 {RATIO} (ref 2–5)
CO2 SERPL-SCNC: 26 MMOL/L (ref 23–29)
CREAT SERPL-MCNC: 0.8 MG/DL (ref 0.5–1.4)
DIFFERENTIAL METHOD: ABNORMAL
EOSINOPHIL # BLD AUTO: 0.1 K/UL (ref 0–0.5)
EOSINOPHIL NFR BLD: 1.3 % (ref 0–8)
ERYTHROCYTE [DISTWIDTH] IN BLOOD BY AUTOMATED COUNT: 14.1 % (ref 11.5–14.5)
EST. GFR  (AFRICAN AMERICAN): >60 ML/MIN/1.73 M^2
EST. GFR  (NON AFRICAN AMERICAN): >60 ML/MIN/1.73 M^2
GLUCOSE SERPL-MCNC: 92 MG/DL (ref 70–110)
HCT VFR BLD AUTO: 43.1 % (ref 40–54)
HDLC SERPL-MCNC: 85 MG/DL (ref 40–75)
HDLC SERPL: 36.8 % (ref 20–50)
HGB BLD-MCNC: 14.1 G/DL (ref 14–18)
IMM GRANULOCYTES # BLD AUTO: 0.06 K/UL (ref 0–0.04)
IMM GRANULOCYTES NFR BLD AUTO: 0.8 % (ref 0–0.5)
LDLC SERPL CALC-MCNC: 134.2 MG/DL (ref 63–159)
LYMPHOCYTES # BLD AUTO: 1.1 K/UL (ref 1–4.8)
LYMPHOCYTES NFR BLD: 14.6 % (ref 18–48)
MCH RBC QN AUTO: 32.9 PG (ref 27–31)
MCHC RBC AUTO-ENTMCNC: 32.7 G/DL (ref 32–36)
MCV RBC AUTO: 101 FL (ref 82–98)
MONOCYTES # BLD AUTO: 0.6 K/UL (ref 0.3–1)
MONOCYTES NFR BLD: 8.5 % (ref 4–15)
NEUTROPHILS # BLD AUTO: 5.6 K/UL (ref 1.8–7.7)
NEUTROPHILS NFR BLD: 73.7 % (ref 38–73)
NONHDLC SERPL-MCNC: 146 MG/DL
NRBC BLD-RTO: 0 /100 WBC
PLATELET # BLD AUTO: 485 K/UL (ref 150–450)
PMV BLD AUTO: 9.8 FL (ref 9.2–12.9)
POTASSIUM SERPL-SCNC: 3.8 MMOL/L (ref 3.5–5.1)
PROT SERPL-MCNC: 8.1 G/DL (ref 6–8.4)
RBC # BLD AUTO: 4.28 M/UL (ref 4.6–6.2)
SODIUM SERPL-SCNC: 139 MMOL/L (ref 136–145)
TRIGL SERPL-MCNC: 59 MG/DL (ref 30–150)
WBC # BLD AUTO: 7.55 K/UL (ref 3.9–12.7)

## 2022-05-28 PROCEDURE — 80053 COMPREHEN METABOLIC PANEL: CPT | Performed by: NURSE PRACTITIONER

## 2022-05-28 PROCEDURE — 36415 COLL VENOUS BLD VENIPUNCTURE: CPT | Mod: PO | Performed by: NURSE PRACTITIONER

## 2022-05-28 PROCEDURE — 86803 HEPATITIS C AB TEST: CPT | Performed by: NURSE PRACTITIONER

## 2022-05-28 PROCEDURE — 80061 LIPID PANEL: CPT | Performed by: NURSE PRACTITIONER

## 2022-05-28 PROCEDURE — 85025 COMPLETE CBC W/AUTO DIFF WBC: CPT | Performed by: NURSE PRACTITIONER

## 2022-05-29 NOTE — PROCEDURES
Suture Removal    Date/Time: 5/26/2022 2:00 PM  Location procedure was performed: [facility]  Performed by: Laura Macias NP  Authorized by: Laura Macias NP   Assisting provider: Laura Macias NP  Pre-operative diagnosis: laceration  Post-operative diagnosis: laceration  Body area: lower extremity  Location details: right lower leg  Description of findings: well healed laceration. staples intact.   Wound Appearance: clean, well healed, tender and no drainage  Staples Removed: 4  Post-removal: Steri-Strips applied  Complications: No  Estimated blood loss (mL): 0  Specimens: No  Implants: No  Patient tolerance: Patient tolerated the procedure well with no immediate complications

## 2022-05-31 LAB — HCV AB SERPL QL IA: NEGATIVE

## 2022-06-01 DIAGNOSIS — D75.839 THROMBOCYTOSIS: Primary | ICD-10-CM

## 2022-06-01 DIAGNOSIS — D50.9 IRON DEFICIENCY ANEMIA, UNSPECIFIED IRON DEFICIENCY ANEMIA TYPE: ICD-10-CM

## 2022-06-17 ENCOUNTER — TELEPHONE (OUTPATIENT)
Dept: FAMILY MEDICINE | Facility: CLINIC | Age: 42
End: 2022-06-17
Payer: COMMERCIAL

## 2022-06-17 NOTE — TELEPHONE ENCOUNTER
Called pt and left him a voicemail that his appt with Laura on 12/5/22 needs to be rescheduled as Laura will be out on vacation.  Asked pt to please return my call at [phone] to reschedule.

## 2022-12-14 NOTE — PROGRESS NOTES
Subjective:       Patient ID: Syed Coleman is a 42 y.o. male.    Chief Complaint: Follow-up    HPI Here for follow up. States he is doing well. BP well controlled. Anxiety well controlled with just PRN xanax. No indication of abuse. Trazodone prn for sleep disturbance.     He had some abnormal labs at last visit. Had put in additional lab orders but he was not aware and did not have labs done. Will schedule those. He was noted to have elevated platelet count at 485. RBC at 4.28. this could be related to his heavy tobacco use. Will get additional labs.     Left knee pain, popping sensation. Tylenol does not help. No injury. States if he steps wrong then it feels like it might go out from under him. No swelling. Does not want work up for this right now. This is not a new issue.    See ROS.    The following portion of the patients history was reviewed and updated as appropriate: allergies, current medications, past medical and surgical history. Past social history and problem list reviewed. Family PMH and Past social history reviewed. Tobacco, Illicit drug use reviewed.      Review of patient's allergies indicates:   Allergen Reactions    Ibuprofen      Other reaction(s): gasric upset         Current Outpatient Medications:     ALPRAZolam (XANAX) 0.25 MG tablet, 1-2 tablets twice daily as needed for anxiety., Disp: 40 tablet, Rfl: 0    atenoloL-chlorthalidone (TENORETIC) 50-25 mg Tab, Take 1 tablet by mouth once daily., Disp: 90 tablet, Rfl: 2    clobetasoL (TEMOVATE) 0.05 % cream, Apply topically 2 (two) times daily., Disp: 60 g, Rfl: 1    traZODone (DESYREL) 50 MG tablet, TAKE 1 TO 2 TABLETS BY MOUTH AT BEDTIME AS NEEDED FOR INSOMNIA, Disp: 60 tablet, Rfl: 4    Past Medical History:   Diagnosis Date    Anxiety     Hypertension     Tobacco use disorder        Past Surgical History:   Procedure Laterality Date    CORONARY ANGIOGRAPHY N/A 3/5/2020    Procedure: ANGIOGRAM, CORONARY ARTERY;  Surgeon: Ryan  "MD Laura;  Location: [facility] CATH;  Service: Cardiovascular;  Laterality: N/A;    FINGER SURGERY Right     5th digit    LEFT HEART CATHETERIZATION Left 3/5/2020    Procedure: Left heart cath;  Surgeon: Ryan Sanchez MD;  Location: [facility] CATH;  Service: Cardiovascular;  Laterality: Left;    OTHER SURGICAL HISTORY      injection procedure for pain       Social History     Socioeconomic History    Marital status:    Tobacco Use    Smoking status: Every Day     Packs/day: 1.00     Types: Cigarettes    Smokeless tobacco: Never   Substance and Sexual Activity    Alcohol use: Yes     Alcohol/week: 0.0 standard drinks     Comment: Occasionally: Beer    Drug use: No         Review of Systems   Constitutional:  Negative for activity change, appetite change, fatigue and fever.   HENT:  Negative for congestion, rhinorrhea and trouble swallowing.    Eyes:  Negative for visual disturbance.   Respiratory:  Negative for cough, chest tightness, shortness of breath and wheezing.    Cardiovascular:  Negative for chest pain, palpitations and leg swelling.   Gastrointestinal:  Negative for abdominal pain, blood in stool, diarrhea, nausea and vomiting.   Genitourinary:  Negative for difficulty urinating.   Musculoskeletal:  Positive for arthralgias (left knee). Negative for back pain and gait problem.   Skin:  Negative for rash.   Neurological:  Negative for dizziness, weakness and headaches.   Hematological:  Negative for adenopathy. Does not bruise/bleed easily.   Psychiatric/Behavioral:  Positive for sleep disturbance. Negative for decreased concentration and dysphoric mood. The patient is not nervous/anxious.      Objective:      /82   Pulse 80   Temp 97.2 °F (36.2 °C)   Resp 20   Ht 5' 11" (1.803 m)   Wt 69.6 kg (153 lb 7 oz)   SpO2 98%   BMI 21.40 kg/m²      Physical Exam  Constitutional:       Appearance: Normal appearance. He is well-developed and normal weight.   HENT:      Head: Normocephalic.   Eyes:      " Conjunctiva/sclera: Conjunctivae normal.      Pupils: Pupils are equal, round, and reactive to light.   Cardiovascular:      Rate and Rhythm: Normal rate and regular rhythm.      Pulses: Normal pulses.      Heart sounds: Normal heart sounds. No murmur heard.    No gallop.   Pulmonary:      Effort: Pulmonary effort is normal.      Breath sounds: Normal breath sounds.   Abdominal:      General: Bowel sounds are normal.      Palpations: Abdomen is soft.      Tenderness: There is no abdominal tenderness.   Musculoskeletal:      Cervical back: Normal range of motion and neck supple.      Left knee: Crepitus present. Decreased range of motion.      Comments: Gait and coordination normal  strong, equal. Upper and lower extremity strength normal   Skin:     General: Skin is warm and dry.      Capillary Refill: Capillary refill takes less than 2 seconds.      Findings: No rash.   Neurological:      Mental Status: He is alert and oriented to person, place, and time.   Psychiatric:         Attention and Perception: Attention normal.         Mood and Affect: Mood and affect normal.         Speech: Speech normal.         Behavior: Behavior normal.       Assessment:       1. Essential hypertension    2. Hyperlipidemia LDL goal <160    3. Anxiety    4. Cigarette nicotine dependence without complication    5. Chronic pain of left knee        Plan:       Essential hypertension: good BP control on current medications.     Hyperlipidemia LDL goal <160:     Lab Results   Component Value Date    LDLCALC 134.2 05/28/2022        Anxiety: continue xanax only on prn basis.  Do not take with ETOH, Sedatives or other narcotics. Do not take and drive due to potential for sedation. Do not take more than the prescribed amount.   -     ALPRAZolam (XANAX) 0.25 MG tablet; 1-2 tablets twice daily as needed for anxiety.  Dispense: 40 tablet; Refill: 0    Cigarette nicotine dependence without complication: Patient does not want referral to  smoking cessation clinic at this time. Smoking cessation encouraged.     Chronic pain of left knee: will try Mobic. If not improving will refer to Orthopedics for work up.    Other orders  -     meloxicam (MOBIC) 15 MG tablet; Take 1 tablet (15 mg total) by mouth once daily.  Dispense: 30 tablet; Refill: 2       Continue current medication  Take medications only as prescribed  Healthy diet, exercise  Adequate rest  Adequate hydration  Avoid allergens  Avoid excessive caffeine      Follow up 6 months.

## 2022-12-15 ENCOUNTER — OFFICE VISIT (OUTPATIENT)
Dept: FAMILY MEDICINE | Facility: CLINIC | Age: 42
End: 2022-12-15
Payer: COMMERCIAL

## 2022-12-15 VITALS
SYSTOLIC BLOOD PRESSURE: 126 MMHG | DIASTOLIC BLOOD PRESSURE: 82 MMHG | WEIGHT: 153.44 LBS | TEMPERATURE: 97 F | HEART RATE: 80 BPM | BODY MASS INDEX: 21.48 KG/M2 | HEIGHT: 71 IN | RESPIRATION RATE: 20 BRPM | OXYGEN SATURATION: 98 %

## 2022-12-15 DIAGNOSIS — M25.562 CHRONIC PAIN OF LEFT KNEE: ICD-10-CM

## 2022-12-15 DIAGNOSIS — G89.29 CHRONIC PAIN OF LEFT KNEE: ICD-10-CM

## 2022-12-15 DIAGNOSIS — I10 ESSENTIAL HYPERTENSION: Primary | ICD-10-CM

## 2022-12-15 DIAGNOSIS — F17.210 CIGARETTE NICOTINE DEPENDENCE WITHOUT COMPLICATION: ICD-10-CM

## 2022-12-15 DIAGNOSIS — F41.9 ANXIETY: ICD-10-CM

## 2022-12-15 DIAGNOSIS — E78.5 HYPERLIPIDEMIA LDL GOAL <160: ICD-10-CM

## 2022-12-15 PROCEDURE — 3079F PR MOST RECENT DIASTOLIC BLOOD PRESSURE 80-89 MM HG: ICD-10-PCS | Mod: CPTII,S$GLB,, | Performed by: NURSE PRACTITIONER

## 2022-12-15 PROCEDURE — 99214 OFFICE O/P EST MOD 30 MIN: CPT | Mod: S$GLB,,, | Performed by: NURSE PRACTITIONER

## 2022-12-15 PROCEDURE — 3079F DIAST BP 80-89 MM HG: CPT | Mod: CPTII,S$GLB,, | Performed by: NURSE PRACTITIONER

## 2022-12-15 PROCEDURE — 1159F PR MEDICATION LIST DOCUMENTED IN MEDICAL RECORD: ICD-10-PCS | Mod: CPTII,S$GLB,, | Performed by: NURSE PRACTITIONER

## 2022-12-15 PROCEDURE — 99214 PR OFFICE/OUTPT VISIT, EST, LEVL IV, 30-39 MIN: ICD-10-PCS | Mod: S$GLB,,, | Performed by: NURSE PRACTITIONER

## 2022-12-15 PROCEDURE — 3074F SYST BP LT 130 MM HG: CPT | Mod: CPTII,S$GLB,, | Performed by: NURSE PRACTITIONER

## 2022-12-15 PROCEDURE — 1159F MED LIST DOCD IN RCRD: CPT | Mod: CPTII,S$GLB,, | Performed by: NURSE PRACTITIONER

## 2022-12-15 PROCEDURE — 3008F PR BODY MASS INDEX (BMI) DOCUMENTED: ICD-10-PCS | Mod: CPTII,S$GLB,, | Performed by: NURSE PRACTITIONER

## 2022-12-15 PROCEDURE — 3008F BODY MASS INDEX DOCD: CPT | Mod: CPTII,S$GLB,, | Performed by: NURSE PRACTITIONER

## 2022-12-15 PROCEDURE — 3074F PR MOST RECENT SYSTOLIC BLOOD PRESSURE < 130 MM HG: ICD-10-PCS | Mod: CPTII,S$GLB,, | Performed by: NURSE PRACTITIONER

## 2022-12-15 RX ORDER — ALPRAZOLAM 0.25 MG/1
TABLET ORAL
Qty: 40 TABLET | Refills: 0 | Status: SHIPPED | OUTPATIENT
Start: 2022-12-15 | End: 2023-06-16 | Stop reason: SDUPTHER

## 2022-12-15 RX ORDER — MELOXICAM 15 MG/1
15 TABLET ORAL DAILY
Qty: 30 TABLET | Refills: 2 | Status: SHIPPED | OUTPATIENT
Start: 2022-12-15 | End: 2023-03-13

## 2022-12-26 PROBLEM — M25.562 CHRONIC PAIN OF LEFT KNEE: Status: ACTIVE | Noted: 2022-12-26

## 2022-12-26 PROBLEM — G89.29 CHRONIC PAIN OF LEFT KNEE: Status: ACTIVE | Noted: 2022-12-26

## 2023-05-24 ENCOUNTER — OFFICE VISIT (OUTPATIENT)
Dept: FAMILY MEDICINE | Facility: CLINIC | Age: 43
End: 2023-05-24
Payer: COMMERCIAL

## 2023-05-24 DIAGNOSIS — I10 ESSENTIAL HYPERTENSION: ICD-10-CM

## 2023-05-24 DIAGNOSIS — G89.29 CHRONIC PAIN OF RIGHT ANKLE: ICD-10-CM

## 2023-05-24 DIAGNOSIS — M10.9 ACUTE GOUT OF RIGHT ANKLE, UNSPECIFIED CAUSE: Primary | ICD-10-CM

## 2023-05-24 DIAGNOSIS — M25.571 CHRONIC PAIN OF RIGHT ANKLE: ICD-10-CM

## 2023-05-24 PROCEDURE — 3077F PR MOST RECENT SYSTOLIC BLOOD PRESSURE >= 140 MM HG: ICD-10-PCS | Mod: CPTII,S$GLB,, | Performed by: NURSE PRACTITIONER

## 2023-05-24 PROCEDURE — 1160F PR REVIEW ALL MEDS BY PRESCRIBER/CLIN PHARMACIST DOCUMENTED: ICD-10-PCS | Mod: CPTII,S$GLB,, | Performed by: NURSE PRACTITIONER

## 2023-05-24 PROCEDURE — 3077F SYST BP >= 140 MM HG: CPT | Mod: CPTII,S$GLB,, | Performed by: NURSE PRACTITIONER

## 2023-05-24 PROCEDURE — 99213 OFFICE O/P EST LOW 20 MIN: CPT | Mod: S$GLB,,, | Performed by: NURSE PRACTITIONER

## 2023-05-24 PROCEDURE — 1160F RVW MEDS BY RX/DR IN RCRD: CPT | Mod: CPTII,S$GLB,, | Performed by: NURSE PRACTITIONER

## 2023-05-24 PROCEDURE — 3078F DIAST BP <80 MM HG: CPT | Mod: CPTII,S$GLB,, | Performed by: NURSE PRACTITIONER

## 2023-05-24 PROCEDURE — 1159F MED LIST DOCD IN RCRD: CPT | Mod: CPTII,S$GLB,, | Performed by: NURSE PRACTITIONER

## 2023-05-24 PROCEDURE — 1159F PR MEDICATION LIST DOCUMENTED IN MEDICAL RECORD: ICD-10-PCS | Mod: CPTII,S$GLB,, | Performed by: NURSE PRACTITIONER

## 2023-05-24 PROCEDURE — 3008F PR BODY MASS INDEX (BMI) DOCUMENTED: ICD-10-PCS | Mod: CPTII,S$GLB,, | Performed by: NURSE PRACTITIONER

## 2023-05-24 PROCEDURE — 99213 PR OFFICE/OUTPT VISIT, EST, LEVL III, 20-29 MIN: ICD-10-PCS | Mod: S$GLB,,, | Performed by: NURSE PRACTITIONER

## 2023-05-24 PROCEDURE — 3008F BODY MASS INDEX DOCD: CPT | Mod: CPTII,S$GLB,, | Performed by: NURSE PRACTITIONER

## 2023-05-24 PROCEDURE — 3078F PR MOST RECENT DIASTOLIC BLOOD PRESSURE < 80 MM HG: ICD-10-PCS | Mod: CPTII,S$GLB,, | Performed by: NURSE PRACTITIONER

## 2023-05-24 RX ORDER — KETOROLAC TROMETHAMINE 10 MG/1
10 TABLET, FILM COATED ORAL 3 TIMES DAILY
Qty: 15 TABLET | Refills: 0 | Status: SHIPPED | OUTPATIENT
Start: 2023-05-24 | End: 2023-05-29

## 2023-05-24 NOTE — PROGRESS NOTES
Subjective:       Patient ID: Syed Coleman is a 42 y.o. male.    Chief Complaint: rt ankle swelling     here with concerns for right ankle swelling, pain. Redness and warmth over outer ankle area. History of broken ankle with ligament damage 10 years ago. Did not have surgery, wore boot until healed. This episode started about 10 days ago. No acute injury. Had bruising. Limping due to pain. Pain is improving. Taking the mobic daily but it is not helping. See ROS.    The following portion of the patients history was reviewed and updated as appropriate: allergies, current medications, past medical and surgical history. Past social history and problem list reviewed. Family PMH and Past social history reviewed. Tobacco, Illicit drug use reviewed.      Review of patient's allergies indicates:   Allergen Reactions    Ibuprofen      Other reaction(s): gasric upset         Current Outpatient Medications:     ALPRAZolam (XANAX) 0.25 MG tablet, 1-2 tablets twice daily as needed for anxiety., Disp: 40 tablet, Rfl: 0    atenoloL-chlorthalidone (TENORETIC) 50-25 mg Tab, Take 1 tablet by mouth once daily., Disp: 90 tablet, Rfl: 2    meloxicam (MOBIC) 15 MG tablet, TAKE 1 TABLET(15 MG) BY MOUTH EVERY DAY, Disp: 90 tablet, Rfl: 1    traZODone (DESYREL) 50 MG tablet, TAKE 1 TO 2 TABLETS BY MOUTH AT BEDTIME AS NEEDED FOR INSOMNIA, Disp: 60 tablet, Rfl: 3    Past Medical History:   Diagnosis Date    Anxiety     Hypertension     Tobacco use disorder        Past Surgical History:   Procedure Laterality Date    CORONARY ANGIOGRAPHY N/A 3/5/2020    Procedure: ANGIOGRAM, CORONARY ARTERY;  Surgeon: Ryan Sanchez MD;  Location: [facility] CATH;  Service: Cardiovascular;  Laterality: N/A;    FINGER SURGERY Right     5th digit    LEFT HEART CATHETERIZATION Left 3/5/2020    Procedure: Left heart cath;  Surgeon: Ryan Sancehz MD;  Location: [facility] CATH;  Service: Cardiovascular;  Laterality: Left;    OTHER SURGICAL HISTORY      injection  "procedure for pain       Social History     Socioeconomic History    Marital status:    Tobacco Use    Smoking status: Every Day     Packs/day: 1.00     Types: Cigarettes    Smokeless tobacco: Never   Substance and Sexual Activity    Alcohol use: Yes     Alcohol/week: 0.0 standard drinks     Comment: Occasionally: Beer    Drug use: No     Review of Systems   Constitutional:  Negative for fatigue and fever.   Respiratory:  Negative for cough, chest tightness and shortness of breath.    Cardiovascular:  Negative for chest pain and palpitations.   Gastrointestinal:  Negative for abdominal pain, diarrhea, nausea and vomiting.   Musculoskeletal:  Positive for arthralgias and gait problem.        See HPI   Neurological:  Negative for headaches.   Psychiatric/Behavioral:  Negative for dysphoric mood and sleep disturbance. The patient is not nervous/anxious.      Objective:      BP (!) 140/78 (BP Location: Left arm, Patient Position: Sitting)   Pulse 70   Temp 98.2 °F (36.8 °C)   Resp 18   Ht 5' 11" (1.803 m)   Wt 68.2 kg (150 lb 3.9 oz)   SpO2 98%   BMI 20.95 kg/m²      Physical Exam  Constitutional:       Appearance: Normal appearance.   HENT:      Head: Normocephalic.   Neck:      Thyroid: No thyromegaly.      Vascular: No carotid bruit.   Cardiovascular:      Rate and Rhythm: Normal rate and regular rhythm.      Pulses: Normal pulses.      Heart sounds: Normal heart sounds.   Pulmonary:      Effort: Pulmonary effort is normal.      Breath sounds: Normal breath sounds.   Musculoskeletal:      Cervical back: Normal range of motion.      Right lower leg: No edema.      Left lower leg: No edema.      Right ankle: Swelling present. Tenderness present. Decreased range of motion.      Comments: Gait limping. Presents as gout   Skin:     General: Skin is warm and dry.      Capillary Refill: Capillary refill takes less than 2 seconds.   Neurological:      General: No focal deficit present.      Mental Status: He " is alert.   Psychiatric:         Attention and Perception: Attention and perception normal.         Mood and Affect: Mood and affect normal.         Speech: Speech normal.         Behavior: Behavior normal.       Assessment:       1. Acute gout of right ankle, unspecified cause    2. Chronic pain of right ankle    3. Essential hypertension        Plan:       Syed was seen today for rt ankle swelling.    Diagnoses and all orders for this visit:    Acute gout of right ankle, unspecified cause: toradol TID for 5 days. If not improving follow up    Chronic pain of right ankle    Essential hypertension: Good Control. Continue current medications.     Other orders  -     ketorolac (TORADOL) 10 mg tablet; Take 1 tablet (10 mg total) by mouth 3 (three) times daily. for 5 days       Continue current medication  Take medications only as prescribed  Healthy diet, exercise  Adequate rest  Adequate hydration  Avoid allergens  Avoid excessive caffeine

## 2023-05-30 VITALS
WEIGHT: 150.25 LBS | HEART RATE: 70 BPM | HEIGHT: 71 IN | BODY MASS INDEX: 21.03 KG/M2 | RESPIRATION RATE: 18 BRPM | SYSTOLIC BLOOD PRESSURE: 140 MMHG | OXYGEN SATURATION: 98 % | DIASTOLIC BLOOD PRESSURE: 78 MMHG | TEMPERATURE: 98 F

## 2023-06-09 DIAGNOSIS — I10 ESSENTIAL HYPERTENSION: ICD-10-CM

## 2023-06-09 RX ORDER — ATENOLOL AND CHLORTHALIDONE TABLET 50; 25 MG/1; MG/1
TABLET ORAL
Qty: 90 TABLET | Refills: 2 | Status: SHIPPED | OUTPATIENT
Start: 2023-06-09

## 2023-06-16 ENCOUNTER — OFFICE VISIT (OUTPATIENT)
Dept: FAMILY MEDICINE | Facility: CLINIC | Age: 43
End: 2023-06-16
Payer: COMMERCIAL

## 2023-06-16 VITALS
WEIGHT: 158.38 LBS | RESPIRATION RATE: 20 BRPM | DIASTOLIC BLOOD PRESSURE: 72 MMHG | OXYGEN SATURATION: 97 % | TEMPERATURE: 98 F | BODY MASS INDEX: 22.17 KG/M2 | HEIGHT: 71 IN | HEART RATE: 67 BPM | SYSTOLIC BLOOD PRESSURE: 130 MMHG

## 2023-06-16 DIAGNOSIS — F17.210 CIGARETTE NICOTINE DEPENDENCE WITHOUT COMPLICATION: ICD-10-CM

## 2023-06-16 DIAGNOSIS — E78.5 HYPERLIPIDEMIA LDL GOAL <160: ICD-10-CM

## 2023-06-16 DIAGNOSIS — G89.29 CHRONIC PAIN OF LEFT KNEE: ICD-10-CM

## 2023-06-16 DIAGNOSIS — F41.9 ANXIETY: ICD-10-CM

## 2023-06-16 DIAGNOSIS — Z00.00 ANNUAL PHYSICAL EXAM: Primary | ICD-10-CM

## 2023-06-16 DIAGNOSIS — Z00.00 LABORATORY EXAM ORDERED AS PART OF ROUTINE GENERAL MEDICAL EXAMINATION: ICD-10-CM

## 2023-06-16 DIAGNOSIS — M25.562 CHRONIC PAIN OF LEFT KNEE: ICD-10-CM

## 2023-06-16 DIAGNOSIS — F51.01 PRIMARY INSOMNIA: ICD-10-CM

## 2023-06-16 DIAGNOSIS — I10 ESSENTIAL HYPERTENSION: ICD-10-CM

## 2023-06-16 PROCEDURE — 3008F BODY MASS INDEX DOCD: CPT | Mod: CPTII,S$GLB,, | Performed by: NURSE PRACTITIONER

## 2023-06-16 PROCEDURE — 3008F PR BODY MASS INDEX (BMI) DOCUMENTED: ICD-10-PCS | Mod: CPTII,S$GLB,, | Performed by: NURSE PRACTITIONER

## 2023-06-16 PROCEDURE — 3075F SYST BP GE 130 - 139MM HG: CPT | Mod: CPTII,S$GLB,, | Performed by: NURSE PRACTITIONER

## 2023-06-16 PROCEDURE — 1159F PR MEDICATION LIST DOCUMENTED IN MEDICAL RECORD: ICD-10-PCS | Mod: CPTII,S$GLB,, | Performed by: NURSE PRACTITIONER

## 2023-06-16 PROCEDURE — 99396 PREV VISIT EST AGE 40-64: CPT | Mod: S$GLB,,, | Performed by: NURSE PRACTITIONER

## 2023-06-16 PROCEDURE — 3078F DIAST BP <80 MM HG: CPT | Mod: CPTII,S$GLB,, | Performed by: NURSE PRACTITIONER

## 2023-06-16 PROCEDURE — 99396 PR PREVENTIVE VISIT,EST,40-64: ICD-10-PCS | Mod: S$GLB,,, | Performed by: NURSE PRACTITIONER

## 2023-06-16 PROCEDURE — 1160F PR REVIEW ALL MEDS BY PRESCRIBER/CLIN PHARMACIST DOCUMENTED: ICD-10-PCS | Mod: CPTII,S$GLB,, | Performed by: NURSE PRACTITIONER

## 2023-06-16 PROCEDURE — 3078F PR MOST RECENT DIASTOLIC BLOOD PRESSURE < 80 MM HG: ICD-10-PCS | Mod: CPTII,S$GLB,, | Performed by: NURSE PRACTITIONER

## 2023-06-16 PROCEDURE — 1159F MED LIST DOCD IN RCRD: CPT | Mod: CPTII,S$GLB,, | Performed by: NURSE PRACTITIONER

## 2023-06-16 PROCEDURE — 1160F RVW MEDS BY RX/DR IN RCRD: CPT | Mod: CPTII,S$GLB,, | Performed by: NURSE PRACTITIONER

## 2023-06-16 PROCEDURE — 3075F PR MOST RECENT SYSTOLIC BLOOD PRESS GE 130-139MM HG: ICD-10-PCS | Mod: CPTII,S$GLB,, | Performed by: NURSE PRACTITIONER

## 2023-06-16 RX ORDER — ALPRAZOLAM 0.25 MG/1
TABLET ORAL
Qty: 40 TABLET | Refills: 0 | Status: SHIPPED | OUTPATIENT
Start: 2023-06-16 | End: 2024-01-11

## 2023-06-16 NOTE — PROGRESS NOTES
Subjective:       Patient ID: Syed Coleman is a 42 y.o. male.    Chief Complaint: annual exam    HPI here for Annual exam.  Several chronic issues to review. Due for labs.     Multiple chronic issues to review. See ROS/assessment and plan      The following portion of the patients history was reviewed and updated as appropriate: allergies, current medications, past medical and surgical history. Past social history and problem list reviewed. Family PMH and Past social history reviewed. Tobacco, Illicit drug use reviewed.      Review of patient's allergies indicates:   Allergen Reactions    Ibuprofen      Other reaction(s): gasric upset         Current Outpatient Medications:     ALPRAZolam (XANAX) 0.25 MG tablet, 1-2 tablets twice daily as needed for anxiety., Disp: 40 tablet, Rfl: 0    atenoloL-chlorthalidone (TENORETIC) 50-25 mg Tab, TAKE 1 TABLET BY MOUTH EVERY DAY, Disp: 90 tablet, Rfl: 2    meloxicam (MOBIC) 15 MG tablet, TAKE 1 TABLET(15 MG) BY MOUTH EVERY DAY, Disp: 90 tablet, Rfl: 1    traZODone (DESYREL) 50 MG tablet, TAKE 1 TO 2 TABLETS BY MOUTH AT BEDTIME AS NEEDED FOR INSOMNIA, Disp: 60 tablet, Rfl: 3    Past Medical History:   Diagnosis Date    Anxiety     Hypertension     Tobacco use disorder        Past Surgical History:   Procedure Laterality Date    CORONARY ANGIOGRAPHY N/A 3/5/2020    Procedure: ANGIOGRAM, CORONARY ARTERY;  Surgeon: Ryan Sanchez MD;  Location: [facility] CATH;  Service: Cardiovascular;  Laterality: N/A;    FINGER SURGERY Right     5th digit    LEFT HEART CATHETERIZATION Left 3/5/2020    Procedure: Left heart cath;  Surgeon: Ryan Sanchez MD;  Location: [facility] CATH;  Service: Cardiovascular;  Laterality: Left;    OTHER SURGICAL HISTORY      injection procedure for pain       Social History     Socioeconomic History    Marital status:    Tobacco Use    Smoking status: Every Day     Packs/day: 1.00     Types: Cigarettes    Smokeless tobacco: Never   Substance and Sexual  "Activity    Alcohol use: Yes     Alcohol/week: 0.0 standard drinks     Comment: Occasionally: Beer    Drug use: No     Review of Systems   Constitutional:  Negative for fatigue and fever.   HENT: Negative.     Respiratory:  Negative for cough, chest tightness, shortness of breath and wheezing.    Cardiovascular:  Negative for chest pain, palpitations and leg swelling.   Gastrointestinal:  Negative for abdominal pain, blood in stool, constipation, diarrhea, nausea and vomiting.   Genitourinary: Negative.    Musculoskeletal:  Positive for arthralgias. Negative for back pain and gait problem.   Neurological:  Negative for headaches.   Psychiatric/Behavioral:  Negative for dysphoric mood and sleep disturbance. The patient is not nervous/anxious.      Objective:      /72   Pulse 67   Temp 98.2 °F (36.8 °C)   Resp 20   Ht 5' 11" (1.803 m)   Wt 71.9 kg (158 lb 6.4 oz)   SpO2 97%   BMI 22.09 kg/m²      Physical Exam  Constitutional:       Appearance: Normal appearance. He is normal weight.   HENT:      Head: Normocephalic.   Neck:      Thyroid: No thyromegaly.      Vascular: No carotid bruit.   Cardiovascular:      Rate and Rhythm: Normal rate and regular rhythm.      Pulses: Normal pulses.      Heart sounds: Normal heart sounds.   Pulmonary:      Effort: Pulmonary effort is normal.      Breath sounds: Normal breath sounds. No wheezing.   Abdominal:      General: Bowel sounds are normal.      Tenderness: There is no abdominal tenderness.   Musculoskeletal:      Cervical back: Normal range of motion.      Right lower leg: No edema.      Left lower leg: No edema.      Comments: Gait normal.  strong, equal.    Skin:     General: Skin is warm and dry.      Capillary Refill: Capillary refill takes less than 2 seconds.   Neurological:      General: No focal deficit present.      Mental Status: He is alert.   Psychiatric:         Attention and Perception: Attention and perception normal.         Mood and Affect: " Mood and affect normal.         Speech: Speech normal.         Behavior: Behavior normal.       Assessment:       1. Annual physical exam    2. Essential hypertension    3. Anxiety    4. Laboratory exam ordered as part of routine general medical examination    5. Hyperlipidemia LDL goal <160    6. Cigarette nicotine dependence without complication    7. Chronic pain of left knee    8. Primary insomnia        Plan:       Annual exam.     Essential hypertension: Good Control. Continue current medications.     Anxiety: xanax only on prn basis. Does not feel that daily medication is needed.  -     ALPRAZolam (XANAX) 0.25 MG tablet; 1-2 tablets twice daily as needed for anxiety.  Dispense: 40 tablet; Refill: 0  He understands Xanax is a benzodiazepine with the potential for addiction and tolerance.  The patient also understands that all benzos impair reflexes and cognition and so the patient should not drive, operate heavy machinery or make significant decisions while taking the benzodiazepine.  The patient should also not take the benzodiazepines with alcohol.     Laboratory exam ordered as part of routine general medical examination  -     TSH; Future; Expected date: 06/16/2023  -     Hemoglobin A1C; Future; Expected date: 06/16/2023  -     Lipid Panel; Future; Expected date: 06/16/2023  -     Comprehensive Metabolic Panel; Future; Expected date: 06/16/2023  -     CBC Auto Differential; Future; Expected date: 06/16/2023    Hyperlipidemia LDL goal <160: due for labs.     Lab Results   Component Value Date    LDLCALC 134.2 05/28/2022        Cigarette nicotine dependence without complication: encouraged smoking cessation.    Chronic pain of left knee: mobic working well    Primary insomnia: trazodone prn works well.      Continue current medication  Take medications only as prescribed  Healthy diet, exercise  Adequate rest  Adequate hydration  Avoid allergens  Avoid excessive caffeine      Follow up 6 months.

## 2023-06-26 PROBLEM — F51.01 PRIMARY INSOMNIA: Status: ACTIVE | Noted: 2023-06-26

## 2023-07-10 RX ORDER — TRAZODONE HYDROCHLORIDE 50 MG/1
TABLET ORAL
Qty: 60 TABLET | Refills: 3 | Status: SHIPPED | OUTPATIENT
Start: 2023-07-10 | End: 2024-01-11 | Stop reason: ALTCHOICE

## 2023-07-13 NOTE — TELEPHONE ENCOUNTER
He is due for flu vaccine. Refill sent to pharmacy. He is due for repeat labs. Orders placed, please ask him to schedule.   [FreeTextEntry1] : Patient presents to the office for follow-up [de-identified] : Been having back pain, pain did start approximately 2 weeks ago radiates down the left leg, denies any weakness.  Denies any lifting denies any gait instability.  Denies any groin pain.

## 2023-09-18 RX ORDER — MELOXICAM 15 MG/1
15 TABLET ORAL DAILY
Qty: 90 TABLET | Refills: 1 | Status: SHIPPED | OUTPATIENT
Start: 2023-09-18

## 2024-01-11 ENCOUNTER — OFFICE VISIT (OUTPATIENT)
Dept: FAMILY MEDICINE | Facility: CLINIC | Age: 44
End: 2024-01-11
Payer: COMMERCIAL

## 2024-01-11 VITALS
OXYGEN SATURATION: 97 % | HEART RATE: 88 BPM | BODY MASS INDEX: 22.59 KG/M2 | RESPIRATION RATE: 20 BRPM | WEIGHT: 161.38 LBS | TEMPERATURE: 98 F | HEIGHT: 71 IN

## 2024-01-11 DIAGNOSIS — G89.29 CHRONIC PAIN OF LEFT KNEE: ICD-10-CM

## 2024-01-11 DIAGNOSIS — F41.9 ANXIETY: ICD-10-CM

## 2024-01-11 DIAGNOSIS — F51.01 PRIMARY INSOMNIA: ICD-10-CM

## 2024-01-11 DIAGNOSIS — F17.210 CIGARETTE NICOTINE DEPENDENCE WITHOUT COMPLICATION: ICD-10-CM

## 2024-01-11 DIAGNOSIS — I10 ESSENTIAL HYPERTENSION: Primary | ICD-10-CM

## 2024-01-11 DIAGNOSIS — M25.562 CHRONIC PAIN OF LEFT KNEE: ICD-10-CM

## 2024-01-11 DIAGNOSIS — E78.5 HYPERLIPIDEMIA LDL GOAL <160: ICD-10-CM

## 2024-01-11 DIAGNOSIS — Z12.11 COLON CANCER SCREENING: ICD-10-CM

## 2024-01-11 PROCEDURE — 1160F RVW MEDS BY RX/DR IN RCRD: CPT | Mod: CPTII,S$GLB,, | Performed by: NURSE PRACTITIONER

## 2024-01-11 PROCEDURE — 1159F MED LIST DOCD IN RCRD: CPT | Mod: CPTII,S$GLB,, | Performed by: NURSE PRACTITIONER

## 2024-01-11 PROCEDURE — 3008F BODY MASS INDEX DOCD: CPT | Mod: CPTII,S$GLB,, | Performed by: NURSE PRACTITIONER

## 2024-01-11 PROCEDURE — 99214 OFFICE O/P EST MOD 30 MIN: CPT | Mod: S$GLB,,, | Performed by: NURSE PRACTITIONER

## 2024-01-11 RX ORDER — TIZANIDINE 4 MG/1
4 TABLET ORAL NIGHTLY
Qty: 30 TABLET | Refills: 2 | Status: SHIPPED | OUTPATIENT
Start: 2024-01-11 | End: 2024-04-24

## 2024-01-11 RX ORDER — ALPRAZOLAM 0.5 MG/1
0.5 TABLET ORAL DAILY PRN
Qty: 40 TABLET | Refills: 0 | Status: SHIPPED | OUTPATIENT
Start: 2024-01-11

## 2024-01-11 NOTE — PROGRESS NOTES
Subjective:       Patient ID: Syed Coleman is a 43 y.o. male.    Chief Complaint: Follow-up    HPI here for follow up. He did not have labs done after last two visits as ordered. Will reschedule.     Feels the trazodone is not working well for his insomnia.     Several chronic issues to review. See ROS    The following portion of the patients history was reviewed and updated as appropriate: allergies, current medications, past medical and surgical history. Past social history and problem list reviewed. Family PMH and Past social history reviewed. Tobacco, Illicit drug use reviewed.      Review of patient's allergies indicates:   Allergen Reactions    Ibuprofen      Other reaction(s): gasric upset         Current Outpatient Medications:     traZODone (DESYREL) 50 MG tablet, TAKE 1 TO 2 TABLETS BY MOUTH AT BEDTIME AS NEEDED FOR INSOMNIA, Disp: 60 tablet, Rfl: 3    ALPRAZolam (XANAX) 0.25 MG tablet, 1-2 tablets twice daily as needed for anxiety., Disp: 40 tablet, Rfl: 0    atenoloL-chlorthalidone (TENORETIC) 50-25 mg Tab, TAKE 1 TABLET BY MOUTH EVERY DAY, Disp: 90 tablet, Rfl: 2    meloxicam (MOBIC) 15 MG tablet, Take 1 tablet (15 mg total) by mouth once daily., Disp: 90 tablet, Rfl: 1    Past Medical History:   Diagnosis Date    Anxiety     Hypertension     Tobacco use disorder        Past Surgical History:   Procedure Laterality Date    CORONARY ANGIOGRAPHY N/A 3/5/2020    Procedure: ANGIOGRAM, CORONARY ARTERY;  Surgeon: Ryan Sanchez MD;  Location: [facility] CATH;  Service: Cardiovascular;  Laterality: N/A;    FINGER SURGERY Right     5th digit    LEFT HEART CATHETERIZATION Left 3/5/2020    Procedure: Left heart cath;  Surgeon: Ryan Sanchez MD;  Location: [facility] CATH;  Service: Cardiovascular;  Laterality: Left;    OTHER SURGICAL HISTORY      injection procedure for pain       Social History     Socioeconomic History    Marital status:    Tobacco Use    Smoking status: Every Day     Current packs/day:  "1.00     Types: Cigarettes    Smokeless tobacco: Never   Substance and Sexual Activity    Alcohol use: Yes     Alcohol/week: 0.0 standard drinks of alcohol     Comment: Occasionally: Beer    Drug use: No     Review of Systems   Constitutional:  Negative for fatigue and fever.   HENT: Negative.     Eyes:  Negative for visual disturbance.   Respiratory:  Negative for cough, chest tightness, shortness of breath and wheezing.    Cardiovascular:  Negative for chest pain, palpitations and leg swelling.   Gastrointestinal:  Negative for abdominal pain, blood in stool, diarrhea, nausea and vomiting.   Genitourinary: Negative.    Musculoskeletal:  Positive for arthralgias and back pain. Negative for gait problem.   Skin: Negative.    Neurological:  Negative for headaches.   Psychiatric/Behavioral:  Positive for sleep disturbance. Negative for dysphoric mood. The patient is not nervous/anxious.        Objective:      BP (P) 132/78   Pulse 88   Temp 98.1 °F (36.7 °C) (Temporal)   Resp 20   Ht 5' 11" (1.803 m)   Wt 73.2 kg (161 lb 6 oz)   SpO2 97%   BMI 22.51 kg/m²      Physical Exam  Constitutional:       Appearance: Normal appearance. He is normal weight.   HENT:      Head: Normocephalic.   Eyes:      Pupils: Pupils are equal, round, and reactive to light.   Neck:      Thyroid: No thyromegaly.      Vascular: No carotid bruit.   Cardiovascular:      Rate and Rhythm: Normal rate and regular rhythm.      Pulses: Normal pulses.      Heart sounds: Normal heart sounds. No murmur heard.  Pulmonary:      Effort: Pulmonary effort is normal.      Breath sounds: Normal breath sounds. No wheezing.   Abdominal:      General: Bowel sounds are normal.      Tenderness: There is no abdominal tenderness.   Musculoskeletal:         General: Normal range of motion.      Cervical back: Normal range of motion.      Right lower leg: No edema.      Left lower leg: No edema.      Comments: Gait normal.  strong, equal   Skin:     General: " Skin is warm and dry.      Capillary Refill: Capillary refill takes less than 2 seconds.   Neurological:      General: No focal deficit present.      Mental Status: He is alert.   Psychiatric:         Attention and Perception: Attention and perception normal.         Mood and Affect: Mood and affect normal.         Speech: Speech normal.         Behavior: Behavior normal.         Assessment:       1. Essential hypertension    2. Hyperlipidemia LDL goal <160    3. Anxiety    4. Primary insomnia    5. Cigarette nicotine dependence without complication    6. Colon cancer screening    7. Chronic pain of left knee        Plan:       Essential hypertension: good BP control.     Hyperlipidemia LDL goal <160: diet controlled.     Lab Results   Component Value Date    LDLCALC 134.2 05/28/2022        Anxiety: xanax only on prn basis.  -     ALPRAZolam (XANAX) 0.5 MG tablet; Take 1 tablet (0.5 mg total) by mouth daily as needed for Anxiety.  Dispense: 40 tablet; Refill: 0    Primary insomnia: can take two trazodone. Zanaflex for muscle spasms can help with sleep    Cigarette nicotine dependence without complication: smoking cessation encouraged.    Colon cancer screening  -     Cologuard Screening (Multitarget Stool DNA); Future; Expected date: 01/11/2024    Chronic pain of left knee: motrin prn    Other orders  -     tiZANidine (ZANAFLEX) 4 MG tablet; Take 1 tablet (4 mg total) by mouth every evening.  Dispense: 30 tablet; Refill: 2    I spent a total of 32 minutes on the day of the visit.     This includes face to face time with the patient, as well as non-face to face time preparing for and completing the visit (review of prior diagnostic testing and clinical notes, obtaining or reviewing history, documenting clinical information in the EMR, independently interpreting and communicating results to the patient/family and coordinating ongoing care).         Continue current medication  Take medications only as  prescribed  Healthy diet, exercise  Adequate rest  Adequate hydration  Avoid allergens  Avoid excessive caffeine     Follow up 6 months.

## 2024-04-24 RX ORDER — TIZANIDINE 4 MG/1
4 TABLET ORAL NIGHTLY
Qty: 30 TABLET | Refills: 2 | Status: SHIPPED | OUTPATIENT
Start: 2024-04-24

## 2024-06-14 DIAGNOSIS — I10 ESSENTIAL HYPERTENSION: ICD-10-CM

## 2024-06-14 RX ORDER — ATENOLOL AND CHLORTHALIDONE TABLET 50; 25 MG/1; MG/1
TABLET ORAL
Qty: 90 TABLET | Refills: 2 | Status: SHIPPED | OUTPATIENT
Start: 2024-06-14

## 2024-11-14 ENCOUNTER — OFFICE VISIT (OUTPATIENT)
Dept: FAMILY MEDICINE | Facility: CLINIC | Age: 44
End: 2024-11-14
Payer: COMMERCIAL

## 2024-11-14 VITALS
WEIGHT: 160.69 LBS | DIASTOLIC BLOOD PRESSURE: 65 MMHG | HEIGHT: 71 IN | HEART RATE: 86 BPM | BODY MASS INDEX: 22.5 KG/M2 | TEMPERATURE: 98 F | OXYGEN SATURATION: 98 % | SYSTOLIC BLOOD PRESSURE: 92 MMHG

## 2024-11-14 DIAGNOSIS — Z12.5 PROSTATE CANCER SCREENING: ICD-10-CM

## 2024-11-14 DIAGNOSIS — N52.9 ERECTILE DYSFUNCTION, UNSPECIFIED ERECTILE DYSFUNCTION TYPE: ICD-10-CM

## 2024-11-14 DIAGNOSIS — F17.210 CIGARETTE NICOTINE DEPENDENCE WITHOUT COMPLICATION: ICD-10-CM

## 2024-11-14 DIAGNOSIS — S22.41XA CLOSED FRACTURE OF MULTIPLE RIBS OF RIGHT SIDE, INITIAL ENCOUNTER: ICD-10-CM

## 2024-11-14 DIAGNOSIS — I95.9 HYPOTENSION, UNSPECIFIED HYPOTENSION TYPE: ICD-10-CM
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
## 2024-11-14 DIAGNOSIS — Z00.00 ANNUAL PHYSICAL EXAM: Primary | ICD-10-CM

## 2024-11-14 DIAGNOSIS — Z12.11 COLON CANCER SCREENING: ICD-10-CM

## 2024-11-14 DIAGNOSIS — R29.898 WEAKNESS OF BOTH LEGS: ICD-10-CM

## 2024-11-14 DIAGNOSIS — Z00.00 LABORATORY EXAM ORDERED AS PART OF ROUTINE GENERAL MEDICAL EXAMINATION: ICD-10-CM

## 2024-11-14 DIAGNOSIS — Z79.899 ENCOUNTER FOR LONG-TERM (CURRENT) USE OF MEDICATIONS: ICD-10-CM

## 2024-11-14 DIAGNOSIS — I10 ESSENTIAL HYPERTENSION: ICD-10-CM

## 2024-11-14 DIAGNOSIS — Z79.899 ENCOUNTER FOR LONG-TERM (CURRENT) USE OF MEDICATIONS: Primary | ICD-10-CM

## 2024-11-14 DIAGNOSIS — F41.9 ANXIETY: ICD-10-CM

## 2024-11-14 LAB
ALBUMIN SERPL BCP-MCNC: 3 G/DL (ref 3.5–5.2)
ALP SERPL-CCNC: 223 U/L (ref 40–150)
ALT SERPL W/O P-5'-P-CCNC: 27 U/L (ref 10–44)
ANION GAP SERPL CALC-SCNC: 14 MMOL/L (ref 8–16)
AST SERPL-CCNC: 48 U/L (ref 10–40)
BASOPHILS # BLD AUTO: 0.04 K/UL (ref 0–0.2)
BASOPHILS NFR BLD: 0.4 % (ref 0–1.9)
BILIRUB SERPL-MCNC: 0.8 MG/DL (ref 0.1–1)
BUN SERPL-MCNC: 5 MG/DL (ref 6–20)
CALCIUM SERPL-MCNC: 9.4 MG/DL (ref 8.7–10.5)
CHLORIDE SERPL-SCNC: 93 MMOL/L (ref 95–110)
CHOLEST SERPL-MCNC: 161 MG/DL (ref 120–199)
CHOLEST/HDLC SERPL: 2.7 {RATIO} (ref 2–5)
CO2 SERPL-SCNC: 31 MMOL/L (ref 23–29)
COMPLEXED PSA SERPL-MCNC: 0.61 NG/ML (ref 0–4)
CREAT SERPL-MCNC: 0.7 MG/DL (ref 0.5–1.4)
DIFFERENTIAL METHOD BLD: ABNORMAL
EOSINOPHIL # BLD AUTO: 0 K/UL (ref 0–0.5)
EOSINOPHIL NFR BLD: 0.4 % (ref 0–8)
ERYTHROCYTE [DISTWIDTH] IN BLOOD BY AUTOMATED COUNT: 18.2 % (ref 11.5–14.5)
EST. GFR  (NO RACE VARIABLE): >60 ML/MIN/1.73 M^2
ESTIMATED AVG GLUCOSE: 94 MG/DL (ref 68–131)
GLUCOSE SERPL-MCNC: 87 MG/DL (ref 70–110)
HBA1C MFR BLD: 4.9 % (ref 4–5.6)
HCT VFR BLD AUTO: 34.3 % (ref 40–54)
HDLC SERPL-MCNC: 60 MG/DL (ref 40–75)
HDLC SERPL: 37.3 % (ref 20–50)
HGB BLD-MCNC: 11.4 G/DL (ref 14–18)
IMM GRANULOCYTES # BLD AUTO: 0.02 K/UL (ref 0–0.04)
IMM GRANULOCYTES NFR BLD AUTO: 0.2 % (ref 0–0.5)
IRON SERPL-MCNC: 156 UG/DL (ref 45–160)
LDLC SERPL CALC-MCNC: 87.8 MG/DL (ref 63–159)
LYMPHOCYTES # BLD AUTO: 0.9 K/UL (ref 1–4.8)
LYMPHOCYTES NFR BLD: 9.7 % (ref 18–48)
MCH RBC QN AUTO: 35.6 PG (ref 27–31)
MCHC RBC AUTO-ENTMCNC: 33.2 G/DL (ref 32–36)
MCV RBC AUTO: 107 FL (ref 82–98)
MONOCYTES # BLD AUTO: 0.8 K/UL (ref 0.3–1)
MONOCYTES NFR BLD: 8.2 % (ref 4–15)
NEUTROPHILS # BLD AUTO: 7.5 K/UL (ref 1.8–7.7)
NEUTROPHILS NFR BLD: 81.1 % (ref 38–73)
NONHDLC SERPL-MCNC: 101 MG/DL
NRBC BLD-RTO: 0 /100 WBC
PLATELET # BLD AUTO: 309 K/UL (ref 150–450)
PMV BLD AUTO: 9.4 FL (ref 9.2–12.9)
POTASSIUM SERPL-SCNC: 3 MMOL/L (ref 3.5–5.1)
PROT SERPL-MCNC: 7 G/DL (ref 6–8.4)
RBC # BLD AUTO: 3.2 M/UL (ref 4.6–6.2)
SATURATED IRON: 51 % (ref 20–50)
SODIUM SERPL-SCNC: 138 MMOL/L (ref 136–145)
TOTAL IRON BINDING CAPACITY: 305 UG/DL (ref 250–450)
TRANSFERRIN SERPL-MCNC: 206 MG/DL (ref 200–375)
TRIGL SERPL-MCNC: 66 MG/DL (ref 30–150)
TSH SERPL DL<=0.005 MIU/L-ACNC: 2.62 UIU/ML (ref 0.4–4)
WBC # BLD AUTO: 9.28 K/UL (ref 3.9–12.7)

## 2024-11-14 PROCEDURE — 85025 COMPLETE CBC W/AUTO DIFF WBC: CPT | Performed by: NURSE PRACTITIONER

## 2024-11-14 PROCEDURE — 80053 COMPREHEN METABOLIC PANEL: CPT | Performed by: NURSE PRACTITIONER

## 2024-11-14 PROCEDURE — 80061 LIPID PANEL: CPT | Performed by: NURSE PRACTITIONER

## 2024-11-14 PROCEDURE — 83540 ASSAY OF IRON: CPT | Performed by: NURSE PRACTITIONER

## 2024-11-14 PROCEDURE — 83036 HEMOGLOBIN GLYCOSYLATED A1C: CPT | Performed by: NURSE PRACTITIONER

## 2024-11-14 PROCEDURE — 84153 ASSAY OF PSA TOTAL: CPT | Performed by: NURSE PRACTITIONER

## 2024-11-14 PROCEDURE — 84443 ASSAY THYROID STIM HORMONE: CPT | Performed by: NURSE PRACTITIONER

## 2024-11-14 RX ORDER — ALPRAZOLAM 0.5 MG/1
0.5 TABLET ORAL DAILY PRN
Qty: 40 TABLET | Refills: 0 | Status: CANCELLED | OUTPATIENT
Start: 2024-11-14

## 2024-11-14 RX ORDER — HYDROCODONE BITARTRATE AND ACETAMINOPHEN 5; 325 MG/1; MG/1
TABLET ORAL
Status: CANCELLED | OUTPATIENT
Start: 2024-11-14

## 2024-11-14 RX ORDER — ACETAMINOPHEN AND CODEINE PHOSPHATE 300; 30 MG/1; MG/1
1 TABLET ORAL EVERY 4 HOURS PRN
Qty: 20 TABLET | Refills: 0 | Status: SHIPPED | OUTPATIENT
Start: 2024-11-14 | End: 2024-11-24

## 2024-11-14 RX ORDER — ALPRAZOLAM 0.5 MG/1
0.5 TABLET ORAL DAILY PRN
Qty: 40 TABLET | Refills: 0 | Status: SHIPPED | OUTPATIENT
Start: 2024-11-14

## 2024-11-14 RX ORDER — HYDROCODONE BITARTRATE AND ACETAMINOPHEN 5; 325 MG/1; MG/1
TABLET ORAL
COMMUNITY
Start: 2024-10-29 | End: 2024-11-14 | Stop reason: ALTCHOICE

## 2024-11-14 NOTE — PROGRESS NOTES
Subjective:       Patient ID: Syed Coleman is a 44 y.o. male.    Chief Complaint: Annual Exam    HPI  The following portion of the patients history was reviewed and updated as appropriate: allergies, current medications, past medical and surgical history. Past social history and problem list reviewed. Family PMH and Past social history reviewed. Tobacco, Illicit drug use reviewed.      Review of patient's allergies indicates:   Allergen Reactions    Ibuprofen      Other reaction(s): gasric upset         Current Outpatient Medications:     ALPRAZolam (XANAX) 0.5 MG tablet, Take 1 tablet (0.5 mg total) by mouth daily as needed for Anxiety., Disp: 40 tablet, Rfl: 0    atenoloL-chlorthalidone (TENORETIC) 50-25 mg Tab, TAKE 1 TABLET BY MOUTH EVERY DAY, Disp: 90 tablet, Rfl: 2    HYDROcodone-acetaminophen (NORCO) 5-325 mg per tablet, , Disp: , Rfl:     meloxicam (MOBIC) 15 MG tablet, Take 1 tablet (15 mg total) by mouth once daily., Disp: 90 tablet, Rfl: 1    tiZANidine (ZANAFLEX) 4 MG tablet, TAKE 1 TABLET(4 MG) BY MOUTH EVERY EVENING, Disp: 30 tablet, Rfl: 2    Past Medical History:   Diagnosis Date    Anxiety     Hypertension     Tobacco use disorder        Past Surgical History:   Procedure Laterality Date    CORONARY ANGIOGRAPHY N/A 3/5/2020    Procedure: ANGIOGRAM, CORONARY ARTERY;  Surgeon: Ryan Sanchez MD;  Location: [facility] CATH;  Service: Cardiovascular;  Laterality: N/A;    FINGER SURGERY Right     5th digit    LEFT HEART CATHETERIZATION Left 3/5/2020    Procedure: Left heart cath;  Surgeon: Ryan Sanchez MD;  Location: [facility] CATH;  Service: Cardiovascular;  Laterality: Left;    OTHER SURGICAL HISTORY      injection procedure for pain       Social History     Socioeconomic History    Marital status:    Tobacco Use    Smoking status: Every Day     Current packs/day: 1.00     Types: Cigarettes    Smokeless tobacco: Never   Substance and Sexual Activity    Alcohol use: Yes     Alcohol/week: 0.0  "standard drinks of alcohol     Comment: Occasionally: Beer    Drug use: No         Review of Systems   Constitutional:  Positive for fatigue. Negative for fever.   HENT: Negative.     Eyes:  Negative for visual disturbance.   Respiratory:  Positive for cough and wheezing (at times). Negative for chest tightness and shortness of breath.    Cardiovascular:  Negative for chest pain, palpitations and leg swelling.   Gastrointestinal:  Negative for abdominal pain, blood in stool, diarrhea, nausea and vomiting.   Genitourinary: Negative.  Negative for decreased urine volume, difficulty urinating, penile discharge, penile swelling and testicular pain.        Unable to get erection, onset the past 2-3 months   Musculoskeletal:  Positive for arthralgias. Negative for back pain and gait problem.        Right rib pain, s/p fracture   Skin: Negative.    Neurological:  Positive for weakness (at times). Negative for headaches.   Psychiatric/Behavioral:  Negative for dysphoric mood and sleep disturbance. The patient is not nervous/anxious.        Objective:      BP 92/65 (BP Location: Left arm, Patient Position: Sitting)   Pulse 86   Temp 98.2 °F (36.8 °C) (Temporal)   Ht 5' 11" (1.803 m)   Wt 72.9 kg (160 lb 11.5 oz)   SpO2 98%   BMI 22.42 kg/m²      Physical Exam  Constitutional:       General: He is not in acute distress.     Appearance: Normal appearance. He is well-developed.   HENT:      Head: Normocephalic.   Eyes:      Pupils: Pupils are equal, round, and reactive to light.   Neck:      Thyroid: No thyromegaly.      Vascular: No carotid bruit.   Cardiovascular:      Rate and Rhythm: Normal rate and regular rhythm.      Pulses: Normal pulses.      Heart sounds: Normal heart sounds. No murmur heard.  Pulmonary:      Effort: Pulmonary effort is normal. No accessory muscle usage or respiratory distress.      Breath sounds: Normal breath sounds. No wheezing.   Abdominal:      General: Bowel sounds are normal.      " Tenderness: There is no abdominal tenderness.   Musculoskeletal:         General: Normal range of motion.      Cervical back: Normal range of motion.      Right lower leg: No edema.      Left lower leg: No edema.      Comments: Coordination appears normal per video   Skin:     General: Skin is warm and dry.      Capillary Refill: Capillary refill takes less than 2 seconds.   Neurological:      General: No focal deficit present.      Mental Status: He is alert and oriented to person, place, and time.   Psychiatric:         Attention and Perception: Attention and perception normal.         Mood and Affect: Mood and affect normal.         Speech: Speech normal.         Behavior: Behavior normal.         Thought Content: Thought content normal.         Assessment:       1. Anxiety        Plan:       Anxiety       Continue current medication  Take medications only as prescribed  Healthy diet, exercise  Adequate rest  Adequate hydration  Avoid allergens  Avoid excessive caffeine   ***       perception normal.         Mood and Affect: Mood and affect normal.         Speech: Speech normal.         Behavior: Behavior normal.         Thought Content: Thought content normal.         Assessment:       1. Annual physical exam    2. Weakness of both legs    3. Hypotension, unspecified hypotension type    4. Fall, initial encounter    5. Closed fracture of multiple ribs of right side, initial encounter    6. Anxiety    7. Encounter for long-term (current) use of medications    8. Prostate cancer screening    9. Colon cancer screening    10. Laboratory exam ordered as part of routine general medical examination    11. Erectile dysfunction, unspecified erectile dysfunction type    12. Essential hypertension    13. Cigarette nicotine dependence without complication        Plan:       Annual physical exam    Weakness of both legs: Improving.    Hypotension, unspecified hypotension type: advised to stop BP medication for now. He needs to monitor home readings and send readings to me in about 10 days, sooner if BP remains low or goes high.    Fall, initial encounter: ER records reviewed. Could be secondary to Hypotension. He promises he is not drinking ETOH and that it is not the cause for his falls    Closed fracture of multiple ribs of right side, initial encounter: still with discomfort. Advised I will give him a few more pain pills but that will be the last.   -     acetaminophen-codeine 300-30mg (TYLENOL #3) 300-30 mg Tab; Take 1 tablet by mouth every 4 (four) hours as needed (rib pain).  Dispense: 20 tablet; Refill: 0    Anxiety: will give refill for prn use. Last filled January. He has made them last 11 months.  -     ALPRAZolam (XANAX) 0.5 MG tablet; Take 1 tablet (0.5 mg total) by mouth daily as needed for Anxiety.  Dispense: 40 tablet; Refill: 0    Encounter for long-term (current) use of medications  -     CBC Auto Differential  -     Comprehensive Metabolic Panel  -     Iron and TIBC  -     Lipid Panel  -      TSH  -     Hemoglobin A1C    Prostate cancer screening  -     PSA, Screening    Colon cancer screening  -     Fecal Immunochemical Test (iFOBT); Future; Expected date: 11/14/2024    Laboratory exam ordered as part of routine general medical examination  -     Testosterone; Future; Expected date: 11/14/2024    Erectile dysfunction, unspecified erectile dysfunction type: will see if holding his BP medications makes this better. Advised that if symptoms do not improve will refer to Urology    Essential hypertension: hold BP medication for now.     Cigarette nicotine dependence without complication: smoking cessation encouraged.      Continue current medication  Take medications only as prescribed  Healthy diet, exercise  Adequate rest  Adequate hydration  Avoid allergens  Avoid excessive caffeine     Follow up with BP readings in about 10 days.

## 2024-11-15 DIAGNOSIS — R74.8 ELEVATED LIVER ENZYMES: ICD-10-CM

## 2024-11-15 DIAGNOSIS — R74.8 ELEVATED ALKALINE PHOSPHATASE LEVEL: Primary | ICD-10-CM

## 2024-11-15 NOTE — PROGRESS NOTES
Please let him know that his labs showed a high Alkaline Phosphatase level at 223,  AST ( liver enzyme ) 48.  He needs an ultrasound done ASAP.  Please let him know how important it is to get this done. Also needs to avoid alcohol use. His labs also show he is not eating well.  He needs to increase healthy foods, potassium rich foods.  His potassium level was low at 3.0.  needs to take potassium supplement, one daily, for the next week and increase diet.  He can buy potassium at the pharmacy.  He already has follow up scheduled with me in two weeks. Please ask him to get the US done.

## 2024-11-15 NOTE — PROGRESS NOTES
Spoke to pt and informed him that Laura said his labs showed a high Alkaline Phosphatase level at 223, AST ( liver enzyme ) 48. He needs an ultrasound done ASAP and it is very important to get this done. Also, he needs to avoid alcohol use. His labs also show he is not eating well. He needs to increase healthy foods, potassium rich foods. His potassium level was low at 3.0. needs to take potassium supplement, one daily, for the next week and increase diet. He can buy potassium at the pharmacy. He already has follow up scheduled with Laura in two weeks and to make sure he keeps that appt.  Pt verbalizes understanding and u/s scheduled.

## 2024-11-19 DIAGNOSIS — K80.50 CHOLEDOCHOLITHIASIS: Primary | ICD-10-CM

## 2024-11-20 ENCOUNTER — TELEPHONE (OUTPATIENT)
Dept: FAMILY MEDICINE | Facility: CLINIC | Age: 44
End: 2024-11-20
Payer: COMMERCIAL

## 2024-11-20 NOTE — TELEPHONE ENCOUNTER
----- Message from Laura Macias NP sent at 11/19/2024  4:53 PM CST -----  Choledocholithiasis was noted on his ultrasound. This is gallstones in the bile duct. He needs to see General surgery for evaluation. He has gallbladder sludge. He has some fatty liver changes. Because his Alk Phos is so high I want him to see General surgery to see if gallbladder needs to be removed  referral placed.

## 2024-11-20 NOTE — TELEPHONE ENCOUNTER
Pt notified of results and verbalized understanding.  Pt has already scheduled gen surgery appt.  Pt's wife would like to know what kind of problems pt will have after the removal of the gallbladder.

## 2024-11-21 NOTE — TELEPHONE ENCOUNTER
"Left message for pt and pts wife that Laura said "Most people do fine. Sometimes you will find that you are more prone to loose stools."  Asked them to both return call to clinic if they have additional questions.  "

## 2024-12-02 ENCOUNTER — OFFICE VISIT (OUTPATIENT)
Dept: SURGERY | Facility: CLINIC | Age: 44
End: 2024-12-02
Payer: COMMERCIAL

## 2024-12-02 VITALS
SYSTOLIC BLOOD PRESSURE: 135 MMHG | DIASTOLIC BLOOD PRESSURE: 101 MMHG | BODY MASS INDEX: 22.53 KG/M2 | RESPIRATION RATE: 16 BRPM | HEIGHT: 71 IN | TEMPERATURE: 97 F | WEIGHT: 160.94 LBS | HEART RATE: 102 BPM

## 2024-12-02 DIAGNOSIS — K80.50 CHOLEDOCHOLITHIASIS: ICD-10-CM

## 2024-12-02 PROCEDURE — 3075F SYST BP GE 130 - 139MM HG: CPT | Mod: CPTII,S$GLB,, | Performed by: STUDENT IN AN ORGANIZED HEALTH CARE EDUCATION/TRAINING PROGRAM

## 2024-12-02 PROCEDURE — 3008F BODY MASS INDEX DOCD: CPT | Mod: CPTII,S$GLB,, | Performed by: STUDENT IN AN ORGANIZED HEALTH CARE EDUCATION/TRAINING PROGRAM

## 2024-12-02 PROCEDURE — 3080F DIAST BP >= 90 MM HG: CPT | Mod: CPTII,S$GLB,, | Performed by: STUDENT IN AN ORGANIZED HEALTH CARE EDUCATION/TRAINING PROGRAM

## 2024-12-02 PROCEDURE — 99204 OFFICE O/P NEW MOD 45 MIN: CPT | Mod: S$GLB,,, | Performed by: STUDENT IN AN ORGANIZED HEALTH CARE EDUCATION/TRAINING PROGRAM

## 2024-12-02 PROCEDURE — 1159F MED LIST DOCD IN RCRD: CPT | Mod: CPTII,S$GLB,, | Performed by: STUDENT IN AN ORGANIZED HEALTH CARE EDUCATION/TRAINING PROGRAM

## 2024-12-02 PROCEDURE — 99999 PR PBB SHADOW E&M-EST. PATIENT-LVL III: CPT | Mod: PBBFAC,,, | Performed by: STUDENT IN AN ORGANIZED HEALTH CARE EDUCATION/TRAINING PROGRAM

## 2024-12-02 PROCEDURE — 3044F HG A1C LEVEL LT 7.0%: CPT | Mod: CPTII,S$GLB,, | Performed by: STUDENT IN AN ORGANIZED HEALTH CARE EDUCATION/TRAINING PROGRAM

## 2024-12-02 NOTE — PROGRESS NOTES
History & Physical    Subjective     History of Present Illness:  Patient is a 44 y.o. male presents w after a recent bout of low blood pressure that lead to falls and rib fractures.  Blood work showed an elevated alkaline phosphatase which prompted a right upper quadrant ultrasound.  Ultrasound showed sludge in the gallbladder with very mild dilation of the bile duct raising the possibility of choledocholithiasis.    No chief complaint on file.      Review of patient's allergies indicates:   Allergen Reactions    Ibuprofen      Other reaction(s): gasric upset       Current Outpatient Medications   Medication Sig Dispense Refill    ALPRAZolam (XANAX) 0.5 MG tablet Take 1 tablet (0.5 mg total) by mouth daily as needed for Anxiety. 40 tablet 0    meloxicam (MOBIC) 15 MG tablet Take 1 tablet (15 mg total) by mouth once daily. 90 tablet 1    atenoloL-chlorthalidone (TENORETIC) 50-25 mg Tab TAKE 1 TABLET BY MOUTH EVERY DAY (Patient not taking: Reported on 12/2/2024) 90 tablet 2    tiZANidine (ZANAFLEX) 4 MG tablet TAKE 1 TABLET(4 MG) BY MOUTH EVERY EVENING (Patient not taking: Reported on 12/2/2024) 30 tablet 2     No current facility-administered medications for this visit.       Past Medical History:   Diagnosis Date    Anxiety     Hypertension     Tobacco use disorder      Past Surgical History:   Procedure Laterality Date    CORONARY ANGIOGRAPHY N/A 3/5/2020    Procedure: ANGIOGRAM, CORONARY ARTERY;  Surgeon: Ryan Sanchez MD;  Location: [facility] CATH;  Service: Cardiovascular;  Laterality: N/A;    FINGER SURGERY Right     5th digit    LEFT HEART CATHETERIZATION Left 3/5/2020    Procedure: Left heart cath;  Surgeon: Ryan Sanchez MD;  Location: [facility] CATH;  Service: Cardiovascular;  Laterality: Left;    OTHER SURGICAL HISTORY      injection procedure for pain     Family History   Adopted: Yes   Family history unknown: Yes     Social History     Tobacco Use    Smoking status: Every Day     Current packs/day: 1.00      "Types: Cigarettes    Smokeless tobacco: Never   Substance Use Topics    Alcohol use: Yes     Alcohol/week: 0.0 standard drinks of alcohol     Comment: Occasionally: Beer    Drug use: No        Review of Systems:  Review of Systems   Constitutional: Negative.  Negative for fatigue and fever.   HENT: Negative.     Eyes: Negative.    Respiratory:  Positive for chest tightness. Negative for shortness of breath.    Cardiovascular:  Positive for chest pain.   Gastrointestinal: Negative.    Endocrine: Negative.    Genitourinary: Negative.    Musculoskeletal: Negative.    Skin: Negative.    Allergic/Immunologic: Negative.    Neurological: Negative.    Hematological: Negative.    Psychiatric/Behavioral: Negative.            Objective     Vital Signs (Most Recent)  Temp: 97.2 °F (36.2 °C) (12/02/24 1131)  Pulse: 102 (12/02/24 1131)  Resp: 16 (12/02/24 1131)  BP: (!) 135/101 (12/02/24 1131)  5' 11" (1.803 m)  73 kg (160 lb 15 oz)     Physical Exam:  Physical Exam  Constitutional:       General: He is not in acute distress.     Appearance: Normal appearance. He is not ill-appearing, toxic-appearing or diaphoretic.   HENT:      Head: Normocephalic.      Nose: Nose normal.   Eyes:      Conjunctiva/sclera: Conjunctivae normal.   Cardiovascular:      Rate and Rhythm: Normal rate and regular rhythm.   Pulmonary:      Effort: Pulmonary effort is normal.   Abdominal:      Palpations: Abdomen is soft.   Musculoskeletal:         General: Normal range of motion.      Cervical back: Normal range of motion.   Skin:     General: Skin is warm.      Coloration: Skin is not jaundiced.   Neurological:      General: No focal deficit present.      Mental Status: He is alert.   Psychiatric:         Mood and Affect: Mood normal.         Laboratory  Recent LFTs showed a normal bilirubin.  Mild elevation in alk-phos.  AST and ALT were essentially normal    Diagnostic Results:  Ultrasound shows sludge within the gallbladder.  Very mild dilation above " age-related normal of the bile duct.  No discrete evidence of choledocholithiasis       Assessment and Plan   44-year-old male with a recent bout of hypotension that prompted a fall that resulted in rib fractures.  Patient had blood work which showed an elevation in alk-phos which prompted a right upper quadrant ultrasound.  Ultrasound showed sludge within the gallbladder and trivial dilation of the common bile duct.  Bilirubin is normal.  Patient is not jaundiced.    PLAN:  Discuss that his elevation in alk-phos is likely related to recent fall with fracture and not anything to do with the gallbladder or bile ducts.  Discuss that workup of choledocholithiasis would come before cholecystectomy.  If there is ongoing concern about stones or sludge in the bile ducts, MRCP would be the least invasive testing option followed by ERCP if positive, performed by GI.  If patient was found to have choledocholithiasis, would recommend cholecystectomy to prevent future episodes after duct clearance by ERCP.  Given his recent fall with fracture, no clear indication for ongoing testing as this is the likely cause of his elevation in alk-phos which prompted additional testing.  Would consider repeat CMP in the coming weeks.  Follow up with me as needed

## 2024-12-09 ENCOUNTER — OFFICE VISIT (OUTPATIENT)
Dept: FAMILY MEDICINE | Facility: CLINIC | Age: 44
End: 2024-12-09
Payer: COMMERCIAL

## 2024-12-09 VITALS
TEMPERATURE: 98 F | HEART RATE: 105 BPM | BODY MASS INDEX: 22.23 KG/M2 | RESPIRATION RATE: 17 BRPM | SYSTOLIC BLOOD PRESSURE: 136 MMHG | WEIGHT: 158.75 LBS | HEIGHT: 71 IN | DIASTOLIC BLOOD PRESSURE: 88 MMHG | OXYGEN SATURATION: 98 %

## 2024-12-09 DIAGNOSIS — F17.210 CIGARETTE NICOTINE DEPENDENCE WITHOUT COMPLICATION: ICD-10-CM

## 2024-12-09 DIAGNOSIS — F41.9 ANXIETY: ICD-10-CM

## 2024-12-09 DIAGNOSIS — S22.41XA CLOSED FRACTURE OF MULTIPLE RIBS OF RIGHT SIDE, INITIAL ENCOUNTER: Primary | ICD-10-CM

## 2024-12-09 DIAGNOSIS — I10 ESSENTIAL HYPERTENSION: ICD-10-CM

## 2024-12-09 DIAGNOSIS — F51.01 PRIMARY INSOMNIA: ICD-10-CM

## 2024-12-09 DIAGNOSIS — N52.9 ERECTILE DYSFUNCTION, UNSPECIFIED ERECTILE DYSFUNCTION TYPE: ICD-10-CM

## 2024-12-09 LAB — TESTOST SERPL-MCNC: 729 NG/DL (ref 304–1227)

## 2024-12-09 PROCEDURE — 3008F BODY MASS INDEX DOCD: CPT | Mod: CPTII,S$GLB,, | Performed by: NURSE PRACTITIONER

## 2024-12-09 PROCEDURE — 3075F SYST BP GE 130 - 139MM HG: CPT | Mod: CPTII,S$GLB,, | Performed by: NURSE PRACTITIONER

## 2024-12-09 PROCEDURE — 36415 COLL VENOUS BLD VENIPUNCTURE: CPT | Mod: S$GLB,,, | Performed by: NURSE PRACTITIONER

## 2024-12-09 PROCEDURE — 84403 ASSAY OF TOTAL TESTOSTERONE: CPT | Performed by: NURSE PRACTITIONER

## 2024-12-09 PROCEDURE — 3079F DIAST BP 80-89 MM HG: CPT | Mod: CPTII,S$GLB,, | Performed by: NURSE PRACTITIONER

## 2024-12-09 PROCEDURE — 99214 OFFICE O/P EST MOD 30 MIN: CPT | Mod: S$GLB,,, | Performed by: NURSE PRACTITIONER

## 2024-12-09 PROCEDURE — 1160F RVW MEDS BY RX/DR IN RCRD: CPT | Mod: CPTII,S$GLB,, | Performed by: NURSE PRACTITIONER

## 2024-12-09 PROCEDURE — 3044F HG A1C LEVEL LT 7.0%: CPT | Mod: CPTII,S$GLB,, | Performed by: NURSE PRACTITIONER

## 2024-12-09 PROCEDURE — 1159F MED LIST DOCD IN RCRD: CPT | Mod: CPTII,S$GLB,, | Performed by: NURSE PRACTITIONER

## 2024-12-09 RX ORDER — ESZOPICLONE 2 MG/1
2 TABLET, FILM COATED ORAL NIGHTLY
Qty: 30 TABLET | Refills: 2 | Status: SHIPPED | OUTPATIENT
Start: 2024-12-09 | End: 2025-03-09

## 2024-12-09 RX ORDER — DULOXETIN HYDROCHLORIDE 20 MG/1
20 CAPSULE, DELAYED RELEASE ORAL DAILY
Qty: 30 CAPSULE | Refills: 2 | Status: SHIPPED | OUTPATIENT
Start: 2024-12-09 | End: 2025-12-09

## 2024-12-09 RX ORDER — SILDENAFIL 100 MG/1
100 TABLET, FILM COATED ORAL DAILY PRN
Qty: 10 TABLET | Refills: 3 | Status: SHIPPED | OUTPATIENT
Start: 2024-12-09 | End: 2025-12-09

## 2024-12-10 ENCOUNTER — OFFICE VISIT (OUTPATIENT)
Dept: UROLOGY | Facility: CLINIC | Age: 44
End: 2024-12-10
Payer: COMMERCIAL

## 2024-12-10 VITALS — BODY MASS INDEX: 22.19 KG/M2 | WEIGHT: 158.5 LBS | HEIGHT: 71 IN

## 2024-12-10 DIAGNOSIS — N52.01 ERECTILE DYSFUNCTION DUE TO ARTERIAL INSUFFICIENCY: ICD-10-CM

## 2024-12-10 PROCEDURE — 99203 OFFICE O/P NEW LOW 30 MIN: CPT | Mod: S$GLB,,,

## 2024-12-10 PROCEDURE — 99999 PR PBB SHADOW E&M-EST. PATIENT-LVL III: CPT | Mod: PBBFAC,,,

## 2024-12-10 PROCEDURE — 1159F MED LIST DOCD IN RCRD: CPT | Mod: CPTII,S$GLB,,

## 2024-12-10 PROCEDURE — 3044F HG A1C LEVEL LT 7.0%: CPT | Mod: CPTII,S$GLB,,

## 2024-12-10 PROCEDURE — 1160F RVW MEDS BY RX/DR IN RCRD: CPT | Mod: CPTII,S$GLB,,

## 2024-12-10 PROCEDURE — 3008F BODY MASS INDEX DOCD: CPT | Mod: CPTII,S$GLB,,

## 2024-12-10 NOTE — PROGRESS NOTES
[facility] Note  Staff: KEESHA eBck    PCP:  KIMBERLY Macias    Chief Complaint: ED    Subjective:        HPI: Syed Coleman is a 44 y.o. male NEW PATIENT presents today for evaluation of ED.  He was seen by PCP yesterday for the same and prescribed Viagra.  He states he has not taken this medication yet.  He states he is not taking any medications for ED in the past.  We discussed all treatment options for ED including PDE5 inhibitors, vacuum erection device, ICI, and penile prosthetic.  He denies any new or worsening urinary complaints such as dysuria, hematuria, fever, flank pain, abdominal pain, and difficulty urinating.    Questions asked the pt during ov today:  Urgency: no, urge incontinence? no  Dysuria: No  Gross Hematuria:No  Straining:No, Hesistancy:No, Intermittency:No}, Weak stream:No  ED:Yes     Last PSA Screening:   Lab Results   Component Value Date    PSA 0.61 11/14/2024       History of Kidney Stones?:  no    Constipation issues?:  no      REVIEW OF SYSTEMS:  Review of Systems   Constitutional: Negative.  Negative for chills and fever.   Gastrointestinal: Negative.  Negative for abdominal pain, constipation, diarrhea, nausea and vomiting.   Genitourinary: Negative.  Negative for dysuria, flank pain, frequency, hematuria and urgency.   Musculoskeletal:  Positive for falls.       PMHx:  Past Medical History:   Diagnosis Date    Anxiety     Hypertension     Tobacco use disorder        PSHx:  Past Surgical History:   Procedure Laterality Date    CORONARY ANGIOGRAPHY N/A 3/5/2020    Procedure: ANGIOGRAM, CORONARY ARTERY;  Surgeon: Ryan Sanchez MD;  Location: [facility] CATH;  Service: Cardiovascular;  Laterality: N/A;    FINGER SURGERY Right     5th digit    LEFT HEART CATHETERIZATION Left 3/5/2020    Procedure: Left heart cath;  Surgeon: Ryan Sanchez MD;  Location: [facility] CATH;  Service: Cardiovascular;  Laterality: Left;    OTHER SURGICAL HISTORY      injection  procedure for pain       Fam Hx:  pt adopted    Soc Hx:  Lives in [place]    Allergies:  Ibuprofen    Medications: reviewed     Objective:   There were no vitals filed for this visit.    Physical Exam  Constitutional:       Appearance: Normal appearance.   Pulmonary:      Effort: Pulmonary effort is normal.   Abdominal:      General: There is no distension.      Palpations: Abdomen is soft.      Tenderness: There is no abdominal tenderness.   Musculoskeletal:         General: Normal range of motion.      Cervical back: Normal range of motion.   Skin:     General: Skin is warm.   Neurological:      Mental Status: He is oriented to person, place, and time.   Psychiatric:         Mood and Affect: Mood normal.         Behavior: Behavior normal.           Assessment:       1. Erectile dysfunction due to arterial insufficiency          Plan:     Use Viagra as needed as prescribed    F/u as needed    [facility]:  Pending    KEESHA Beck

## 2024-12-10 NOTE — PROGRESS NOTES
Subjective:    Patient ID: Syed Coleman is a 44 y.o. male.    Chief Complaint: Follow-up      History of Present Illness    CHIEF COMPLAINT:  Patient presents today for follow-up on various health concerns.    HYPERTENSION:  He reports his blood pressure has been increasing. Home monitoring showed initially low readings for the first couple of days after discontinuing medication, but then stabilized. A recent home reading was 120/78. However, he mentions his blood pressure was elevated during a recent ultrasound appointment.    CHRONIC PAIN:  He reports ongoing daily chronic pain affecting multiple areas including the lower back, knees, and feet. His ribs are , with pain extending to the upper body region. He confirms tenderness to touch persists but denies any bruising. He is not taking Mobic or Zanaflex, reporting they do not seem to help.    MENTAL HEALTH:  He reports ongoing anxiety, feeling worried and stressed, particularly due to being out of work and the holiday season. He denies feeling depressed.    SLEEP ISSUES:  He uses Xanax for sleep, which he finds helpful. He attempts to use it as infrequently as possible, typically trying half a tablet but occasionally requiring a whole tablet for effectiveness. He denies success with previously prescribed Trazodone. He reports a prior negative experience with Ambien, describing hallucinations, and has not used it since.    SEXUAL HEALTH:  He reports experiencing erectile dysfunction. Sexual desire is present, but he is unable to maintain an erection. States he is not able to get an erection at all.  This issue is affecting his relationship, though his partner has been understanding. Advised that he needs evaluation by Urology.  Viagra may not be effective but he can give it a try. Discussed proper dose and potential side effects.    SOCIAL HISTORY:  He is currently out of work. 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 He continues to use tobacco. He denies consuming caffeine or energy drinks, but acknowledges drinking Sprite.       The patient's history was thoroughly reviewed and updated where necessary, including allergies, current medications, past medical and surgical history. The social history, problem list, family medical history, tobacco use, and illicit drug use were also reviewed and documented.    Review of patient's allergies indicates:   Allergen Reactions    Ibuprofen      Other reaction(s): gasric upset       Current Outpatient Medications:     ALPRAZolam (XANAX) 0.5 MG tablet, Take 1 tablet (0.5 mg total) by mouth daily as needed for Anxiety., Disp: 40 tablet, Rfl: 0    atenoloL-chlorthalidone (TENORETIC) 50-25 mg Tab, TAKE 1 TABLET BY MOUTH EVERY DAY (Patient taking differently: Take 0.5 tablets by mouth once daily.), Disp: 90 tablet, Rfl: 2    DULoxetine (CYMBALTA) 20 MG capsule, Take 1 capsule (20 mg total) by mouth once daily., Disp: 30 capsule, Rfl: 2    eszopiclone (LUNESTA) 2 MG Tab, Take 1 tablet (2 mg total) by mouth every evening., Disp: 30 tablet, Rfl: 2    sildenafiL (VIAGRA) 100 MG tablet, Take 1 tablet (100 mg total) by mouth daily as needed for Erectile Dysfunction., Disp: 10 tablet, Rfl: 3    Past Medical History:   Diagnosis Date    Anxiety     Hypertension     Tobacco use disorder      Past Surgical History:   Procedure Laterality Date    CORONARY ANGIOGRAPHY N/A 3/5/2020    Procedure: ANGIOGRAM, CORONARY ARTERY;  Surgeon: Ryan Sanchez MD;  Location: [facility] CATH;  Service: Cardiovascular;  Laterality: N/A;    FINGER SURGERY Right     5th digit    LEFT HEART CATHETERIZATION Left 3/5/2020    Procedure: Left heart cath;  Surgeon: Ryan Sanchez MD;  Location: [facility] CATH;  Service: Cardiovascular;  Laterality: Left;    OTHER SURGICAL HISTORY      injection procedure for pain     Social History     Socioeconomic History    Marital status:    Tobacco Use    Smoking  status: Every Day     Current packs/day: 1.00     Types: Cigarettes    Smokeless tobacco: Never   Substance and Sexual Activity    Alcohol use: Yes     Alcohol/week: 0.0 standard drinks of alcohol     Comment: Occasionally: Beer    Drug use: No     Social Drivers of Health     Financial Resource Strain: High Risk (12/4/2024)    Overall Financial Resource Strain (CARDIA)     Difficulty of Paying Living Expenses: Very hard   Food Insecurity: Food Insecurity Present (12/4/2024)    Hunger Vital Sign     Worried About Running Out of Food in the Last Year: Often true     Ran Out of Food in the Last Year: Often true   Physical Activity: Unknown (12/4/2024)    Exercise Vital Sign     Days of Exercise per Week: Patient declined   Stress: Patient Declined (12/4/2024)    [place] of [employer] - Occupational Stress Questionnaire     Feeling of Stress : Patient declined   Housing Stability: High Risk (12/4/2024)    Housing Stability Vital Sign     Unable to Pay for Housing in the Last Year: Yes     Review of Systems   Constitutional:  Negative for activity change, appetite change, fatigue and fever.   HENT:  Negative for nasal congestion, rhinorrhea and trouble swallowing.    Eyes:  Negative for visual disturbance.   Respiratory:  Negative for cough, chest tightness, shortness of breath and wheezing.    Cardiovascular:  Negative for chest pain, palpitations and leg swelling.   Gastrointestinal:  Negative for abdominal pain, blood in stool, diarrhea, nausea and vomiting.   Genitourinary:  Positive for erectile dysfunction. Negative for bladder incontinence, difficulty urinating, discharge and dysuria.   Musculoskeletal:  Positive for arthralgias and back pain. Negative for gait problem.   Integumentary:  Negative for rash.   Neurological:  Negative for dizziness, weakness and headaches.   Hematological:  Negative for adenopathy. Does not bruise/bleed easily.   Psychiatric/Behavioral:  Positive for sleep  "disturbance. Negative for decreased concentration, dysphoric mood, self-injury and suicidal ideas. The patient is nervous/anxious.      Objective:      Vitals:    12/09/24 0822   BP: 136/88   Pulse: 105   Resp: 17   Temp: 98.2 °F (36.8 °C)   TempSrc: Temporal   SpO2: 98%   Weight: 72 kg (158 lb 11.7 oz)   Height: 5' 11" (1.803 m)   PainSc: 0-No pain     Physical Exam  Constitutional:       Appearance: Normal appearance. He is normal weight.   HENT:      Head: Normocephalic.   Eyes:      Pupils: Pupils are equal, round, and reactive to light.   Neck:      Thyroid: No thyromegaly.      Vascular: No carotid bruit.   Cardiovascular:      Rate and Rhythm: Normal rate and regular rhythm.      Pulses: Normal pulses.      Heart sounds: Normal heart sounds. No murmur heard.  Pulmonary:      Effort: Pulmonary effort is normal.      Breath sounds: Normal breath sounds. No wheezing.   Chest:      Chest wall: Tenderness (over right ribs) present. No deformity or crepitus.   Abdominal:      General: Bowel sounds are normal.      Tenderness: There is no abdominal tenderness.   Musculoskeletal:         General: Normal range of motion.      Cervical back: Normal range of motion.      Right lower leg: No edema.      Left lower leg: No edema.      Comments: Gait normal.  strong, equal   Skin:     General: Skin is warm and dry.      Capillary Refill: Capillary refill takes less than 2 seconds.   Neurological:      General: No focal deficit present.      Mental Status: He is alert.   Psychiatric:         Attention and Perception: Attention and perception normal.         Mood and Affect: Affect normal. Mood is anxious.         Speech: Speech normal.         Behavior: Behavior normal.         Assessment:       1. Closed fracture of multiple ribs of right side, initial encounter    2. Essential hypertension    3. Anxiety    4. Cigarette nicotine dependence without complication    5. Erectile dysfunction, unspecified erectile " dysfunction type    6. Primary insomnia        Assessment & Plan    IMPRESSION:  - Assessed blood pressure, noting recent increase  - Evaluated chronic pain in lower back, knees, and feet  - Considered ED issues  - Will restart blood pressure medication at half dose due to elevated readings  - Chose Cymbalta for chronic pain, anxiety, and depression management  - Opted for Lunesta as alternative sleep aid after previous adverse reaction to Ambien  - Noted tender ribs from previous injury, still healing  - Observed elevated pulse rate, possibly due to recent exertion    CHRONIC PAIN AND MOOD DISORDERS:  - Explained Cymbalta's dual action on chronic pain and mood disorders.  - Started Cymbalta 1 tablet daily for chronic pain, anxiety, and depression.  - Continued Xanax as needed for anxiety and sleep, preferably at half tablet when possible.    HYPERTENSION:  - Patient to monitor blood pressure readings at home.  - Patient to report if blood pressure consistently remains above 90 (diastolic) despite medication.  - Restarted blood pressure medication at half tablet in the morning.    ERECTILE DYSFUNCTION:  - Discussed need for urology evaluation for ED   - Started Viagra 100mg; instructed to break tablet into quarters or halves, take no more than 1 full tablet every 24 hours.  - Referred to urology for potential ED evaluation.    INSOMNIA:  - Started Lunesta for sleep.    TESTICULAR FUNCTION:  - Testosterone level ordered.    COLON CANCER SCREENING:  - Complete FIT kit for colon cancer screening (previously provided).    FOLLOW-UP AND MONITORING:  - Follow up in 1 month to assess effectiveness of new medications.  - Contact the office if any problems or concerns with new medications arise.        Plan:       Closed fracture of multiple ribs of right side, initial encounter: healing well.     Essential hypertension: see above. Start back on BP medication 1/2 dose    Anxiety: continue xanax prn. Start  Cymbalta    Cigarette nicotine dependence without complication: Patient does not want referral to smoking cessation clinic at this time. Smoking cessation encouraged.    Erectile dysfunction, unspecified erectile dysfunction type  -     Ambulatory referral/consult to Urology; Future; Expected date: 12/16/2024  -     TESTOSTERONE    Primary insomnia  -     eszopiclone (LUNESTA) 2 MG Tab; Take 1 tablet (2 mg total) by mouth every evening.  Dispense: 30 tablet; Refill: 2    Other orders  -     sildenafiL (VIAGRA) 100 MG tablet; Take 1 tablet (100 mg total) by mouth daily as needed for Erectile Dysfunction.  Dispense: 10 tablet; Refill: 3  -     DULoxetine (CYMBALTA) 20 MG capsule; Take 1 capsule (20 mg total) by mouth once daily.  Dispense: 30 capsule; Refill: 2       Continue current medication  Take medications only as prescribed  Healthy diet, exercise  Adequate rest  Adequate hydration  Avoid allergens  Avoid excessive caffeine     Follow up one month    This note was generated with the assistance of ambient listening technology. Verbal consent was obtained by the patient and accompanying visitor(s) for the recording of patient appointment to facilitate this note. I attest to having reviewed and edited the generated note for accuracy, though some syntax or spelling errors may persist. Please contact the author of this note for any clarification.

## 2025-01-28 ENCOUNTER — TELEPHONE (OUTPATIENT)
Dept: FAMILY MEDICINE | Facility: CLINIC | Age: 45
End: 2025-01-28
Payer: COMMERCIAL

## 2025-01-28 NOTE — TELEPHONE ENCOUNTER
----- Message from Sugar sent at 1/28/2025  1:25 PM CST -----  Contact: self  Type:  Patient Returning Call    Who Called:  the patient  Who Left Message for Patient:  Kendy   Does the patient know what this is regarding?:  yes  Best Call Back Number:  [phone]  Additional Information:  needing same day appt

## 2025-01-28 NOTE — TELEPHONE ENCOUNTER
----- Message from Jeff sent at 1/28/2025 11:20 AM CST -----  Contact: self  Type:  Same Day Appointment Request    Caller is requesting a same day appointment.  Caller declined first available appointment listed below.      Name of Caller:  PT  When is the first available appointment?  N/a  Symptoms:  coughing, vomiting, diarrhea,   Best Call Back Number:  [phone]   Additional Information:

## 2025-01-29 ENCOUNTER — OFFICE VISIT (OUTPATIENT)
Dept: FAMILY MEDICINE | Facility: CLINIC | Age: 45
End: 2025-01-29
Payer: COMMERCIAL

## 2025-01-29 VITALS
SYSTOLIC BLOOD PRESSURE: 126 MMHG | TEMPERATURE: 98 F | HEART RATE: 102 BPM | OXYGEN SATURATION: 99 % | WEIGHT: 150.38 LBS | DIASTOLIC BLOOD PRESSURE: 74 MMHG | BODY MASS INDEX: 21.05 KG/M2 | HEIGHT: 71 IN | RESPIRATION RATE: 18 BRPM

## 2025-01-29 DIAGNOSIS — F10.10 ALCOHOL ABUSE: ICD-10-CM

## 2025-01-29 DIAGNOSIS — F41.9 ANXIETY: ICD-10-CM

## 2025-01-29 DIAGNOSIS — E86.0 DEHYDRATION: ICD-10-CM

## 2025-01-29 DIAGNOSIS — K29.70 VIRAL GASTRITIS: ICD-10-CM

## 2025-01-29 DIAGNOSIS — J02.9 SORE THROAT: Primary | ICD-10-CM

## 2025-01-29 DIAGNOSIS — R11.2 NAUSEA AND VOMITING, UNSPECIFIED VOMITING TYPE: ICD-10-CM

## 2025-01-29 LAB
CTP QC/QA: YES
S PYO RRNA THROAT QL PROBE: NEGATIVE

## 2025-01-29 PROCEDURE — 87880 STREP A ASSAY W/OPTIC: CPT | Mod: QW,,, | Performed by: NURSE PRACTITIONER

## 2025-01-29 PROCEDURE — 3008F BODY MASS INDEX DOCD: CPT | Mod: CPTII,S$GLB,, | Performed by: NURSE PRACTITIONER

## 2025-01-29 PROCEDURE — 99214 OFFICE O/P EST MOD 30 MIN: CPT | Mod: 25,S$GLB,, | Performed by: NURSE PRACTITIONER

## 2025-01-29 PROCEDURE — 1159F MED LIST DOCD IN RCRD: CPT | Mod: CPTII,S$GLB,, | Performed by: NURSE PRACTITIONER

## 2025-01-29 PROCEDURE — 3074F SYST BP LT 130 MM HG: CPT | Mod: CPTII,S$GLB,, | Performed by: NURSE PRACTITIONER

## 2025-01-29 PROCEDURE — 3078F DIAST BP <80 MM HG: CPT | Mod: CPTII,S$GLB,, | Performed by: NURSE PRACTITIONER

## 2025-01-29 PROCEDURE — 1160F RVW MEDS BY RX/DR IN RCRD: CPT | Mod: CPTII,S$GLB,, | Performed by: NURSE PRACTITIONER

## 2025-01-29 RX ORDER — ONDANSETRON 8 MG/1
8 TABLET, ORALLY DISINTEGRATING ORAL EVERY 6 HOURS PRN
Qty: 20 TABLET | Refills: 0 | Status: SHIPPED | OUTPATIENT
Start: 2025-01-29

## 2025-01-29 RX ORDER — ALPRAZOLAM 0.5 MG/1
0.5 TABLET ORAL DAILY PRN
Qty: 40 TABLET | Refills: 0 | Status: SHIPPED | OUTPATIENT
Start: 2025-01-29

## 2025-01-29 NOTE — PROGRESS NOTES
Subjective:       Patient ID: Syed Coleman is a 44 y.o. male.    Chief Complaint: Nausea and Diarrhea    HPI onset about 5 days. Having N/V/D. No fever. His wife is also sick. Back of throat hurts. Has bad taste in mouth. He is drinking fluids. No appetite. States not eating much. Stools dark brown, mucous. Has been weaning off ETOH.  States his last drink was yesterday. States he does not feel this has anything to do with his weaning of ETOH. His last episode of diarrhea was yesterday. He denies any blood in his stool, vomitus.     See ROS    The following portion of the patients history was reviewed and updated as appropriate: allergies, current medications, past medical and surgical history. Past social history and problem list reviewed. Family PMH and Past social history reviewed. Tobacco, Illicit drug use reviewed.      Review of patient's allergies indicates:   Allergen Reactions    Ibuprofen      Other reaction(s): gasric upset         Current Outpatient Medications:     ALPRAZolam (XANAX) 0.5 MG tablet, Take 1 tablet (0.5 mg total) by mouth daily as needed for Anxiety., Disp: 40 tablet, Rfl: 0    DULoxetine (CYMBALTA) 20 MG capsule, Take 1 capsule (20 mg total) by mouth once daily., Disp: 30 capsule, Rfl: 2    eszopiclone (LUNESTA) 2 MG Tab, Take 1 tablet (2 mg total) by mouth every evening., Disp: 30 tablet, Rfl: 2    sildenafiL (VIAGRA) 100 MG tablet, Take 1 tablet (100 mg total) by mouth daily as needed for Erectile Dysfunction., Disp: 10 tablet, Rfl: 3    atenoloL-chlorthalidone (TENORETIC) 50-25 mg Tab, TAKE 1 TABLET BY MOUTH EVERY DAY (Patient not taking: Reported on 1/29/2025), Disp: 90 tablet, Rfl: 2    Past Medical History:   Diagnosis Date    Anxiety     Hypertension     Tobacco use disorder        Past Surgical History:   Procedure Laterality Date    CORONARY ANGIOGRAPHY N/A 3/5/2020    Procedure: ANGIOGRAM, CORONARY ARTERY;  Surgeon: Ryan Sanchez MD;  Location: [facility];  Service:  Cardiovascular;  Laterality: N/A;    FINGER SURGERY Right     5th digit    LEFT HEART CATHETERIZATION Left 3/5/2020    Procedure: Left heart cath;  Surgeon: Ryan Sanchez MD;  Location: [facility] CATH;  Service: Cardiovascular;  Laterality: Left;    OTHER SURGICAL HISTORY      injection procedure for pain       Social History     Socioeconomic History    Marital status:    Tobacco Use    Smoking status: Every Day     Current packs/day: 1.00     Types: Cigarettes    Smokeless tobacco: Never   Substance and Sexual Activity    Alcohol use: Yes     Alcohol/week: 0.0 standard drinks of alcohol     Comment: Occasionally: Beer    Drug use: No     Social Drivers of Health     Financial Resource Strain: High Risk (12/4/2024)    Overall Financial Resource Strain (CARDIA)     Difficulty of Paying Living Expenses: Very hard   Food Insecurity: Food Insecurity Present (12/4/2024)    Hunger Vital Sign     Worried About Running Out of Food in the Last Year: Often true     Ran Out of Food in the Last Year: Often true   Physical Activity: Unknown (12/4/2024)    Exercise Vital Sign     Days of Exercise per Week: Patient declined   Stress: Patient Declined (12/4/2024)    [employer] [place] of Occup[employer] - Occupational Stress Questionnaire     Feeling of Stress : Patient declined   Housing Stability: High Risk (12/4/2024)    Housing Stability Vital Sign     Unable to Pay for Housing in the Last Year: Yes     Review of Systems   Constitutional:  Positive for fatigue. Negative for fever.   HENT:  Positive for sore throat. Negative for postnasal drip and rhinorrhea.    Respiratory:  Negative for cough, shortness of breath and wheezing.    Cardiovascular:  Negative for chest pain and palpitations.   Gastrointestinal:  Positive for diarrhea, nausea and vomiting. Negative for abdominal pain and blood in stool.   Musculoskeletal:  Negative for arthralgias.   Neurological:  Negative for headaches.       Objective:      /74  "(BP Location: Right arm, Patient Position: Sitting)   Pulse 102   Temp 97.7 °F (36.5 °C) (Oral)   Resp 18   Ht 5' 11" (1.803 m)   Wt 68.2 kg (150 lb 5.7 oz)   SpO2 99%   BMI 20.97 kg/m²      Physical Exam  Constitutional:       Appearance: Normal appearance. He is normal weight. He is ill-appearing.   HENT:      Head: Normocephalic.      Mouth/Throat:      Pharynx: Posterior oropharyngeal erythema and postnasal drip present. No oropharyngeal exudate.   Eyes:      Pupils: Pupils are equal, round, and reactive to light.   Neck:      Thyroid: No thyromegaly.      Vascular: No carotid bruit.   Cardiovascular:      Rate and Rhythm: Normal rate and regular rhythm.      Pulses: Normal pulses.      Heart sounds: Normal heart sounds. No murmur heard.  Pulmonary:      Effort: Pulmonary effort is normal.      Breath sounds: Normal breath sounds. No wheezing.   Abdominal:      General: Bowel sounds are normal.      Tenderness: There is no abdominal tenderness. There is no right CVA tenderness or left CVA tenderness.   Musculoskeletal:         General: Normal range of motion.      Cervical back: Normal range of motion.      Right lower leg: No edema.      Left lower leg: No edema.      Comments: Gait normal.  strong, equal   Skin:     General: Skin is warm and dry.      Capillary Refill: Capillary refill takes less than 2 seconds.   Neurological:      General: No focal deficit present.      Mental Status: He is alert.   Psychiatric:         Attention and Perception: Attention and perception normal.         Mood and Affect: Mood and affect normal.         Speech: Speech normal.         Behavior: Behavior normal.         Assessment:       1. Sore throat    2. Dehydration    3. Viral gastritis    4. Nausea and vomiting, unspecified vomiting type    5. Anxiety    6. Alcohol abuse        Plan:       Sore throat: strep is negative. Most likely related to vomiting/dehydration  -     POCT Rapid Strep A    Dehydration: his " wife is getting him Pedialyte today. He need to drink plenty of water.  Advised to eat bland, advance as tolerated.     Viral gastritis: ADVISED THAT IF NOT IMPROVING OVER THE NEXT 24 HOURS I NEED TO KNOW OR GO TO ER FOR IV FLUIDS    Nausea and vomiting, unspecified vomiting type: will give Zofran for prn use  -     ondansetron (ZOFRAN-ODT) 8 MG TbDL; Take 1 tablet (8 mg total) by mouth every 6 (six) hours as needed (nausea).  Dispense: 20 tablet; Refill: 0    Anxiety: refill.  Only take as needed.   -     ALPRAZolam (XANAX) 0.5 MG tablet; Take 1 tablet (0.5 mg total) by mouth daily as needed for Anxiety.  Dispense: 40 tablet; Refill: 0  DO NOT TAKE WITH ETOH !!!    Alcohol abuse: he is weaning down on his ETOH use. States he does not drink every day.      Continue current medication  Take medications only as prescribed  Healthy diet  Adequate rest  Adequate hydration  Avoid allergens  Avoid excessive caffeine     Follow up in 24 hours if not improving. To ER if symptoms worsen

## 2025-05-01 PROBLEM — E87.20 LACTIC ACIDOSIS: Status: ACTIVE | Noted: 2025-05-01

## 2025-05-01 PROBLEM — K92.2 UPPER GASTROINTESTINAL BLEED: Status: ACTIVE | Noted: 2025-05-01

## 2025-05-01 PROBLEM — D62 ACUTE BLOOD LOSS ANEMIA: Status: ACTIVE | Noted: 2025-05-01

## 2025-05-01 PROBLEM — E87.1 HYPONATREMIA: Status: ACTIVE | Noted: 2025-05-01

## 2025-05-01 PROBLEM — F10.90 ALCOHOL USE DISORDER: Status: ACTIVE | Noted: 2025-05-01

## 2025-05-01 PROBLEM — E83.42 HYPOMAGNESEMIA: Status: ACTIVE | Noted: 2025-05-01

## 2025-05-01 PROBLEM — I25.10 CAD (CORONARY ARTERY DISEASE): Status: ACTIVE | Noted: 2025-05-01

## 2025-05-01 PROBLEM — E87.6 HYPOKALEMIA: Status: ACTIVE | Noted: 2025-05-01

## 2025-05-01 PROBLEM — S36.119A LIVER INJURY: Status: ACTIVE | Noted: 2025-05-01

## 2025-05-02 PROBLEM — K70.9 ALCOHOLIC LIVER DISEASE: Status: ACTIVE | Noted: 2025-05-02

## 2025-05-02 PROBLEM — E53.8 FOLIC ACID DEFICIENCY: Status: ACTIVE | Noted: 2025-05-02

## 2025-05-02 PROBLEM — R79.89 INCREASED AMMONIA LEVEL: Status: ACTIVE | Noted: 2025-05-02

## 2025-05-02 PROBLEM — Z74.09 OTHER REDUCED MOBILITY: Status: ACTIVE | Noted: 2025-05-02

## 2025-05-02 PROBLEM — E83.39 HYPOPHOSPHATEMIA: Status: ACTIVE | Noted: 2025-05-02

## 2025-05-02 PROBLEM — K22.11 ULCER OF ESOPHAGUS WITH BLEEDING: Status: ACTIVE | Noted: 2025-05-02

## 2025-05-02 PROBLEM — R78.81 BACTEREMIA: Status: ACTIVE | Noted: 2025-05-02

## 2025-05-03 PROBLEM — K76.82 HEPATIC ENCEPHALOPATHY: Status: ACTIVE | Noted: 2025-05-02

## 2025-05-09 PROBLEM — N17.9 AKI (ACUTE KIDNEY INJURY): Status: ACTIVE | Noted: 2025-05-09

## 2025-05-10 PROBLEM — E80.6 HYPERBILIRUBINEMIA: Status: ACTIVE | Noted: 2025-05-10

## 2025-05-17 PROBLEM — R18.8 ASCITES: Status: ACTIVE | Noted: 2025-05-17

## 2025-05-17 PROBLEM — E87.1 HYPONATREMIA: Status: RESOLVED | Noted: 2025-05-01 | Resolved: 2025-05-17

## 2025-05-17 PROBLEM — D72.829 LEUKOCYTOSIS: Status: ACTIVE | Noted: 2025-05-17

## 2025-05-17 PROBLEM — E83.42 HYPOMAGNESEMIA: Status: RESOLVED | Noted: 2025-05-01 | Resolved: 2025-05-17

## 2025-05-17 PROBLEM — E87.6 HYPOKALEMIA: Status: RESOLVED | Noted: 2025-05-01 | Resolved: 2025-05-17

## 2025-05-18 PROBLEM — E87.20 LACTIC ACIDOSIS: Status: RESOLVED | Noted: 2025-05-01 | Resolved: 2025-05-18

## 2025-05-18 PROBLEM — E83.39 HYPOPHOSPHATEMIA: Status: RESOLVED | Noted: 2025-05-02 | Resolved: 2025-05-18

## 2025-05-20 ENCOUNTER — TELEPHONE (OUTPATIENT)
Dept: NEPHROLOGY | Facility: CLINIC | Age: 45
End: 2025-05-20
Payer: COMMERCIAL

## 2025-05-20 NOTE — TELEPHONE ENCOUNTER
Called and spoke to patient to schedule a hospital follow up appointment. Appointment made for 06/12/25 at 1100 with Dr. Hope.

## 2025-05-20 NOTE — TELEPHONE ENCOUNTER
----- Message from Shireen sent at 5/20/2025  9:50 AM CDT -----  Regarding: Needs hospital follow up  Type: Needs Medical AdviceWho Called:  Jesús Call Back Number: [phone] Information: pt needs to be seen in 2 weeks

## 2025-05-29 PROBLEM — Z79.899 ENCOUNTER FOR LONG-TERM (CURRENT) USE OF MEDICATIONS: Status: ACTIVE | Noted: 2025-05-29

## 2025-05-29 PROBLEM — D51.0 PERNICIOUS ANEMIA: Status: ACTIVE | Noted: 2025-05-29

## 2025-05-29 PROBLEM — R74.01 ELEVATED TRANSAMINASE LEVEL: Status: ACTIVE | Noted: 2025-05-29

## 2025-06-11 ENCOUNTER — TELEPHONE (OUTPATIENT)
Dept: NEPHROLOGY | Facility: CLINIC | Age: 45
End: 2025-06-11
Payer: COMMERCIAL

## 2025-06-11 NOTE — TELEPHONE ENCOUNTER
Spoke to pt. - he stated Dr. Toribio asked him to hold off on seeing Dr. Hope at this time - pt. Canceled his appt. For June 12- and did not want to reschedule at this time